# Patient Record
Sex: FEMALE | Race: WHITE | NOT HISPANIC OR LATINO | Employment: UNEMPLOYED | URBAN - METROPOLITAN AREA
[De-identification: names, ages, dates, MRNs, and addresses within clinical notes are randomized per-mention and may not be internally consistent; named-entity substitution may affect disease eponyms.]

---

## 2017-06-25 ENCOUNTER — HOSPITAL ENCOUNTER (EMERGENCY)
Facility: HOSPITAL | Age: 52
Discharge: HOME | End: 2017-06-25
Attending: EMERGENCY MEDICINE
Payer: COMMERCIAL

## 2017-06-25 VITALS
SYSTOLIC BLOOD PRESSURE: 146 MMHG | HEIGHT: 60 IN | HEART RATE: 82 BPM | TEMPERATURE: 99 F | DIASTOLIC BLOOD PRESSURE: 73 MMHG | WEIGHT: 170 LBS | BODY MASS INDEX: 33.38 KG/M2 | OXYGEN SATURATION: 98 % | RESPIRATION RATE: 16 BRPM

## 2017-06-25 DIAGNOSIS — R51.9 HEADACHE: ICD-10-CM

## 2017-06-25 DIAGNOSIS — S00.03XA CONTUSION OF SCALP, INITIAL ENCOUNTER: Primary | ICD-10-CM

## 2017-06-25 PROCEDURE — 25000003 PHARM REV CODE 250: Performed by: EMERGENCY MEDICINE

## 2017-06-25 PROCEDURE — 99284 EMERGENCY DEPT VISIT MOD MDM: CPT

## 2017-06-25 RX ORDER — HYDROCODONE BITARTRATE AND ACETAMINOPHEN 5; 325 MG/1; MG/1
1 TABLET ORAL
Status: COMPLETED | OUTPATIENT
Start: 2017-06-25 | End: 2017-06-25

## 2017-06-25 RX ADMIN — HYDROCODONE BITARTRATE AND ACETAMINOPHEN 1 TABLET: 5; 325 TABLET ORAL at 08:06

## 2017-06-26 NOTE — ED PROVIDER NOTES
"SCRIBE #1 NOTE: I, Kerrie Lara, am scribing for, and in the presence of, Myron Calle MD. I have scribed the entire note.      History      Chief Complaint   Patient presents with    Headache     was hit in head 2 days ago has hurt since then        Review of patient's allergies indicates:  No Known Allergies     HPI   HPI    2017, 8:00 PM   History obtained from the patient      History of Present Illness: Dorothea George is a 52 y.o. female patient who presents to the Emergency Department for HA which onset gradually 2 days ago. Pt reports being hit in on the L parietal region of her head by a ceramic toy train at [facility] 2 days ago and has been having HA since. Symptoms are constant and moderate in severity. The patient describes the sxs as "shooting". No mitigating or exacerbating factors reported. Associated sxs include nausea and dizziness. Prior treatment includes Advil with minimal relief. Pt states her BP is normally ranges from 124-138. Patient denies any fever, chills, neck pain/stiffness, photophobia, visual disturbance, extremity weakness/numbness, vomiting, and all other sxs at this time. No further complaints or concerns at this time.     Arrival mode: Personal vehicle      PCP: Primary Doctor No       Past Medical History:  Past Medical History:   Diagnosis Date    Osteomalacia pelvis        Past Surgical History:  Past Surgical History:   Procedure Laterality Date     SECTION, CLASSIC      HIP SURGERY      TONSILLECTOMY           Family History:  No family history on file.    Social History:  Social History     Social History Main Topics    Smoking status: Never Smoker    Smokeless tobacco: Unknown    Alcohol use No    Drug use: No    Sexual activity: Unknown       ROS   Review of Systems   Constitutional: Negative for chills and fever.   HENT: Negative for sore throat.    Eyes: Negative for photophobia and visual disturbance.   Respiratory: Negative for shortness of " breath.    Cardiovascular: Negative for chest pain.   Gastrointestinal: Positive for nausea. Negative for vomiting.   Genitourinary: Negative for dysuria.   Musculoskeletal: Negative for back pain, neck pain and neck stiffness.   Skin: Negative for rash.   Neurological: Positive for dizziness and headaches. Negative for weakness and numbness.   Hematological: Does not bruise/bleed easily.   All other systems reviewed and are negative.      Physical Exam      Initial Vitals [06/25/17 1943]   BP Pulse Resp Temp SpO2   (!) 207/112 90 16 98.6 °F (37 °C) 99 %      MAP       143.67          Physical Exam  Nursing Notes and Vital Signs Reviewed.  Constitutional: Patient is in no acute distress. Well-developed and well-nourished.  Head: Contusion to L parietal region. No crepitance. Normocephalic.  Eyes: PERRL. EOM intact. Conjunctivae are not pale. No scleral icterus.  ENT: Mucous membranes are moist. Oropharynx is clear and symmetric.    Neck: Supple. Full ROM. No lymphadenopathy.  Cardiovascular: Regular rate. Regular rhythm. No murmurs, rubs, or gallops. Distal pulses are 2+ and symmetric.  Pulmonary/Chest: No respiratory distress. Clear to auscultation bilaterally. No wheezing, rales, or rhonchi.  Abdominal: Soft and non-distended.  There is no tenderness.  No rebound, guarding, or rigidity. Good bowel sounds.  Genitourinary: No CVA tenderness  Musculoskeletal: Moves all extremities. No obvious deformities. No edema. No calf tenderness.  Skin: Warm and dry.  Neurological:  Alert, awake, and appropriate.  Normal speech.  No acute focal neurological deficits are appreciated.  Psychiatric: Normal affect. Good eye contact. Appropriate in content.    ED Course    Procedures  ED Vital Signs:  Vitals:    06/25/17 1943 06/25/17 2011 06/25/17 2017 06/25/17 2032   BP: (!) 207/112 (!) 180/90 (!) 158/75 (!) 146/73   Pulse: 90  84 82   Resp: 16      Temp: 98.6 °F (37 °C)      TempSrc: Oral      SpO2: 99%  99% 98%   Weight: 77.1 kg  (170 lb)      Height: 5' (1.524 m)        Imaging Results:  Imaging Results          CT Head Without Contrast (Final result)  Result time 06/25/17 20:31:50    Final result by Willis Cano MD (06/25/17 20:31:50)                 Impression:         Negative noncontrast CT scan of the brain.         All CT scans at this facility use dose modulation, iterative reconstruction, and/or weight based dosing when appropriate to reduce radiation dose to as low as reasonably achievable.       Electronically signed by: WILLIS CANO MD  Date:     06/25/17  Time:    20:31              Narrative:    CT HEAD WITHOUT CONTRAST    Date: 06/25/17 20:11:52    Clinical indication: Head trauma, headache, hit on left parietal region 2 days ago by a ceramic toy train at [employer].    Technique:  Standard noncontrast CT scan of the brain. No previous for comparison.     Findings:  The ventricles are nondilated. Gray and white matter structures reveal normal attenuation. No hemorrhage, mass effect or edema is identified.     The skull and skull base are unremarkable. Visualized paranasal sinuses and mastoid air cells are clear.                                      The Emergency Provider reviewed the vital signs and test results, which are outlined above.    ED Discussion     8:38 PM: Reassessed pt at this time.  Pt states her condition has improved at this time. Pt's BP has come down. Discussed with pt all pertinent ED information and results. Discussed pt dx of contusion of scalp and plan of tx. Gave pt all f/u and return to the ED instructions. All questions and concerns were addressed at this time. Pt expresses understanding of information and instructions, and is comfortable with plan to discharge. Pt is stable for discharge.  Discussed with pt to f/u with PCP.    Patient's headache is either consistent with previous headache and/or lacks features concerning for emergent or life threatening condition.  I do not suspect SAH,  meningitis, increased IC pressure, infectious, toxic, vascular, CNS, or other EMC.  I have discussed this at length with patient and/or family/caretaker.    ED Medication(s):  Medications   hydrocodone-acetaminophen 5-325mg per tablet 1 tablet (1 tablet Oral Given 6/25/17 2016)       New Prescriptions    No medications on file       Follow-up Information     [facility] - .    Specialty:  Emergency Medicine  Why:  If symptoms worsen.  Patient should return to the ED for any concerns or worsening of condition.  Contact information:  [address] [facility] [address] [phone]                   Medical Decision Making    Medical Decision Making:   Clinical Tests:   Radiological Study: Ordered and Reviewed           Scribe Attestation:   Scribe #1: I performed the above scribed service and the documentation accurately describes the services I performed. I attest to the accuracy of the note.    Attending:   Physician Attestation Statement for Scribe #1: I, Myron Calle MD, personally performed the services described in this documentation, as scribed by Kerrie Lara, in my presence, and it is both accurate and complete.          Clinical Impression       ICD-10-CM ICD-9-CM   1. Contusion of scalp, initial encounter S00.03XA 920   2. Headache R51 784.0       Disposition:   Disposition: Discharged  Condition: Stable         Myron Calle MD  06/25/17 2400

## 2017-06-27 ENCOUNTER — OFFICE VISIT (OUTPATIENT)
Dept: INTERNAL MEDICINE | Facility: CLINIC | Age: 52
End: 2017-06-27
Payer: COMMERCIAL

## 2017-06-27 VITALS
OXYGEN SATURATION: 98 % | WEIGHT: 174.63 LBS | HEIGHT: 60 IN | SYSTOLIC BLOOD PRESSURE: 154 MMHG | DIASTOLIC BLOOD PRESSURE: 96 MMHG | HEART RATE: 95 BPM | TEMPERATURE: 100 F | BODY MASS INDEX: 34.28 KG/M2

## 2017-06-27 DIAGNOSIS — S00.03XA CONTUSION OF SCALP, INITIAL ENCOUNTER: ICD-10-CM

## 2017-06-27 DIAGNOSIS — Z00.00 ROUTINE CHECK-UP: Primary | ICD-10-CM

## 2017-06-27 PROCEDURE — 99999 PR PBB SHADOW E&M-EST. PATIENT-LVL III: CPT | Mod: PBBFAC,,, | Performed by: NURSE PRACTITIONER

## 2017-06-27 PROCEDURE — 99204 OFFICE O/P NEW MOD 45 MIN: CPT | Mod: S$GLB,,, | Performed by: NURSE PRACTITIONER

## 2017-06-27 RX ORDER — PROMETHAZINE HYDROCHLORIDE 25 MG/1
25 TABLET ORAL EVERY 6 HOURS PRN
Qty: 20 TABLET | Refills: 0 | Status: SHIPPED | OUTPATIENT
Start: 2017-06-27 | End: 2017-10-13 | Stop reason: ALTCHOICE

## 2017-06-27 RX ORDER — PROMETHAZINE HYDROCHLORIDE 25 MG/1
25 TABLET ORAL EVERY 6 HOURS PRN
Qty: 20 TABLET | Refills: 0 | Status: SHIPPED | OUTPATIENT
Start: 2017-06-27 | End: 2017-06-27

## 2017-06-27 RX ORDER — HYDROCODONE BITARTRATE AND ACETAMINOPHEN 5; 325 MG/1; MG/1
1 TABLET ORAL EVERY 8 HOURS PRN
Qty: 20 TABLET | Refills: 0 | Status: SHIPPED | OUTPATIENT
Start: 2017-06-27 | End: 2017-10-13 | Stop reason: ALTCHOICE

## 2017-06-27 NOTE — PROGRESS NOTES
Subjective:      Patient ID: Dorothea George is a 52 y.o. female.    Chief Complaint: Head contusion (rack fell on head at work)    HPI:  Patient is here for a follow up from and ER visit two days ago and to establish care with dr razo as her pcp.  She was seen in the ER after hitting her head on a kid's ride at [employer].  She hit the left side of her head, had lots of headaches and pain the next day also.  She had a CT scan in the ER and was given one Norco.  She has headaches on occasion still, when she does her BP goes up.  At home it was 122/80.  Says she is nauseated sometimes, no vomiting.  When she reads her headache returns    Past Medical History:   Diagnosis Date    Osteomalacia pelvis        Past Surgical History:   Procedure Laterality Date     SECTION, CLASSIC      HIP SURGERY      TONSILLECTOMY         Lab Results   Component Value Date    WBC 12.74 (H) 2016    HGB 13.3 2016    HCT 37.9 2016     2016    ALT 50 (H) 2016    AST 45 (H) 2016     2016    K 3.8 2016     2016    CREATININE 0.9 2016    BUN 11 2016    CO2 21 (L) 2016    INR 1.0 2016       BP (!) 154/96   Pulse 95   Temp 100.1 °F (37.8 °C) (Tympanic)   Ht 5' (1.524 m)   Wt 79.2 kg (174 lb 9.7 oz)   SpO2 98%   BMI 34.10 kg/m²       Review of Systems   Constitutional: Negative for appetite change, fatigue and unexpected weight change.   HENT: Negative for congestion, ear pain, postnasal drip, rhinorrhea, sinus pressure, sneezing, sore throat, tinnitus, trouble swallowing and voice change.    Eyes: Negative for pain, discharge, redness, itching and visual disturbance.   Respiratory: Negative for cough, chest tightness, shortness of breath and wheezing.    Cardiovascular: Negative for chest pain, palpitations and leg swelling.   Gastrointestinal: Negative for abdominal distention, abdominal pain, blood in stool, constipation,  diarrhea, nausea and vomiting.        No reflux.   Genitourinary: Negative for difficulty urinating, dyspareunia, flank pain, menstrual problem and pelvic pain.   Musculoskeletal: Negative for arthralgias, back pain, myalgias and neck stiffness.   Skin: Negative for color change and rash.   Neurological: Positive for headaches. Negative for dizziness.   Psychiatric/Behavioral: Negative for confusion and sleep disturbance. The patient is not nervous/anxious.       Objective:     Physical Exam   Constitutional: She is oriented to person, place, and time. She appears well-developed and well-nourished. No distress.   Eyes: Conjunctivae are normal. Pupils are equal, round, and reactive to light.   Neck: Normal range of motion.   Musculoskeletal:   Normal gait   Neurological: She is alert and oriented to person, place, and time. She displays normal reflexes. No cranial nerve deficit. Coordination normal.   Skin: Skin is warm and dry.   Psychiatric: She has a normal mood and affect. Her behavior is normal.     Assessment:      1. Routine check-up    2. Contusion of scalp, initial encounter      Plan:   Routine check-up  -     Discontinue: promethazine (PHENERGAN) 25 MG tablet; Take 1 tablet (25 mg total) by mouth every 6 (six) hours as needed for Nausea.  Dispense: 20 tablet; Refill: 0  -     TSH; Future; Expected date: 06/27/2017  -     CBC auto differential; Future; Expected date: 06/27/2017  -     Lipid panel; Future; Expected date: 06/27/2017  -     Comprehensive metabolic panel; Future; Expected date: 06/27/2017    Contusion of scalp, initial encounter    Other orders  -     promethazine (PHENERGAN) 25 MG tablet; Take 1 tablet (25 mg total) by mouth every 6 (six) hours as needed for Nausea.  Dispense: 20 tablet; Refill: 0  -     hydrocodone-acetaminophen 5-325mg (NORCO) 5-325 mg per tablet; Take 1 tablet by mouth every 8 (eight) hours as needed for Pain.  Dispense: 20 tablet; Refill: 0    we discussed reasons to  return to the ER.  Needs brain rest. Discussed no reading, watching TV, computers, smart phones for the next several days.  Advance as tolerated.  Will see dr razo to [facility] care      Current Outpatient Prescriptions:     hydrocodone-acetaminophen 5-325mg (NORCO) 5-325 mg per tablet, Take 1 tablet by mouth every 8 (eight) hours as needed for Pain., Disp: 20 tablet, Rfl: 0    promethazine (PHENERGAN) 25 MG tablet, Take 1 tablet (25 mg total) by mouth every 6 (six) hours as needed for Nausea., Disp: 20 tablet, Rfl: 0

## 2017-06-29 ENCOUNTER — LAB VISIT (OUTPATIENT)
Dept: LAB | Facility: HOSPITAL | Age: 52
End: 2017-06-29
Attending: NURSE PRACTITIONER
Payer: COMMERCIAL

## 2017-06-29 DIAGNOSIS — Z00.00 ROUTINE CHECK-UP: ICD-10-CM

## 2017-06-29 LAB
ALBUMIN SERPL BCP-MCNC: 3.9 G/DL
ALP SERPL-CCNC: 70 U/L
ALT SERPL W/O P-5'-P-CCNC: 44 U/L
ANION GAP SERPL CALC-SCNC: 10 MMOL/L
AST SERPL-CCNC: 48 U/L
BASOPHILS # BLD AUTO: 0.02 K/UL
BASOPHILS NFR BLD: 0.3 %
BILIRUB SERPL-MCNC: 0.5 MG/DL
BUN SERPL-MCNC: 13 MG/DL
CALCIUM SERPL-MCNC: 9.6 MG/DL
CHLORIDE SERPL-SCNC: 104 MMOL/L
CHOLEST/HDLC SERPL: 6.8 {RATIO}
CO2 SERPL-SCNC: 26 MMOL/L
CREAT SERPL-MCNC: 0.8 MG/DL
DIFFERENTIAL METHOD: NORMAL
EOSINOPHIL # BLD AUTO: 0.2 K/UL
EOSINOPHIL NFR BLD: 2.7 %
ERYTHROCYTE [DISTWIDTH] IN BLOOD BY AUTOMATED COUNT: 13 %
EST. GFR  (AFRICAN AMERICAN): >60 ML/MIN/1.73 M^2
EST. GFR  (NON AFRICAN AMERICAN): >60 ML/MIN/1.73 M^2
GLUCOSE SERPL-MCNC: 96 MG/DL
HCT VFR BLD AUTO: 40 %
HDL/CHOLESTEROL RATIO: 14.8 %
HDLC SERPL-MCNC: 284 MG/DL
HDLC SERPL-MCNC: 42 MG/DL
HGB BLD-MCNC: 13.6 G/DL
LDLC SERPL CALC-MCNC: 187 MG/DL
LYMPHOCYTES # BLD AUTO: 2.4 K/UL
LYMPHOCYTES NFR BLD: 36 %
MCH RBC QN AUTO: 30.6 PG
MCHC RBC AUTO-ENTMCNC: 34 %
MCV RBC AUTO: 90 FL
MONOCYTES # BLD AUTO: 0.5 K/UL
MONOCYTES NFR BLD: 7.5 %
NEUTROPHILS # BLD AUTO: 3.6 K/UL
NEUTROPHILS NFR BLD: 53.4 %
NONHDLC SERPL-MCNC: 242 MG/DL
PLATELET # BLD AUTO: 294 K/UL
PMV BLD AUTO: 9.8 FL
POTASSIUM SERPL-SCNC: 4.4 MMOL/L
PROT SERPL-MCNC: 7.8 G/DL
RBC # BLD AUTO: 4.44 M/UL
SODIUM SERPL-SCNC: 140 MMOL/L
TRIGL SERPL-MCNC: 275 MG/DL
WBC # BLD AUTO: 6.78 K/UL

## 2017-06-29 PROCEDURE — 84443 ASSAY THYROID STIM HORMONE: CPT

## 2017-06-29 PROCEDURE — 80053 COMPREHEN METABOLIC PANEL: CPT

## 2017-06-29 PROCEDURE — 80061 LIPID PANEL: CPT

## 2017-06-29 PROCEDURE — 85025 COMPLETE CBC W/AUTO DIFF WBC: CPT

## 2017-06-29 PROCEDURE — 36415 COLL VENOUS BLD VENIPUNCTURE: CPT | Mod: PO

## 2017-06-30 LAB — TSH SERPL DL<=0.005 MIU/L-ACNC: 3.51 UIU/ML

## 2017-07-06 ENCOUNTER — OFFICE VISIT (OUTPATIENT)
Dept: INTERNAL MEDICINE | Facility: CLINIC | Age: 52
End: 2017-07-06
Payer: COMMERCIAL

## 2017-07-06 VITALS
BODY MASS INDEX: 33.17 KG/M2 | WEIGHT: 175.69 LBS | OXYGEN SATURATION: 97 % | HEIGHT: 61 IN | DIASTOLIC BLOOD PRESSURE: 82 MMHG | HEART RATE: 104 BPM | SYSTOLIC BLOOD PRESSURE: 126 MMHG | TEMPERATURE: 100 F

## 2017-07-06 DIAGNOSIS — E78.00 PURE HYPERCHOLESTEROLEMIA: ICD-10-CM

## 2017-07-06 DIAGNOSIS — Z00.00 ROUTINE GENERAL MEDICAL EXAMINATION AT A HEALTH CARE FACILITY: Primary | ICD-10-CM

## 2017-07-06 PROCEDURE — 99396 PREV VISIT EST AGE 40-64: CPT | Mod: S$GLB,,, | Performed by: INTERNAL MEDICINE

## 2017-07-06 PROCEDURE — 99999 PR PBB SHADOW E&M-EST. PATIENT-LVL III: CPT | Mod: PBBFAC,,, | Performed by: INTERNAL MEDICINE

## 2017-07-06 RX ORDER — ATORVASTATIN CALCIUM 20 MG/1
20 TABLET, FILM COATED ORAL DAILY
Qty: 90 TABLET | Refills: 3 | Status: SHIPPED | OUTPATIENT
Start: 2017-07-06 | End: 2021-02-03

## 2017-07-06 NOTE — PROGRESS NOTES
"HPI:  Patient is a 52-year-old female who comes in today for her initial visit myself to establish care.  Patient at this time has no acute medical problems or complaints.    Current MEDS: medcard review, verified and update  Allergies: Per the electronic medical record    Past Medical History:   Diagnosis Date    Hyperlipidemia     Osteomalacia pelvis        Past Surgical History:   Procedure Laterality Date     SECTION, CLASSIC      TONSILLECTOMY      TOTAL HIP ARTHROPLASTY Left     revised once as well       SHx: per the electronic medical record    FHx: recorded in the electronic medical record    ROS:    denies any chest pains or shortness of breath. Denies any nausea, vomiting or diarrhea. Denies any fever, chills or sweats. Denies any change in weight, voice, stool, skin or hair. Denies any dysuria, dyspepsia or dysphagia. Denies any change in vision, hearing or headaches. Denies any swollen lymph nodes or loss of memory.    PE:  /82 (BP Location: Right arm, Patient Position: Sitting, BP Method: Manual)   Pulse 104   Temp 99.9 °F (37.7 °C) (Tympanic)   Ht 5' 1" (1.549 m)   Wt 79.7 kg (175 lb 11.3 oz)   SpO2 97%   BMI 33.20 kg/m²   Gen: Well-developed, well-nourished, female, in no acute distress, oriented x3  HEENT: neck is supple, no adenopathy, carotids 2+ equal without bruits, thyroid exam normal size without nodules.  CHEST: clear to auscultation and percussion  CVS: regular rate and rhythm without significant murmur, gallop, or rubs  ABD: soft, benign, no rebound no guarding, no distention. Bowel sounds are normal.     Nontender,  no palpable masses, no organomegaly and no audible bruits.  BREAST: no masses.  No nipple inversion, retraction, or deviation.  EXT: no clubbing, cyanosis, or edema  LYMPH: no cervical, inguinal, or axillary adenopathy  FEET: no loss of sensation.  No ulcers or pressure sores.  NEURO: gait normal.  Cranial nerves II- XII intact. No nystagmus.  Speech " normal.   Gross motor and sensory unremarkable.  PELVIC: deferred    Lab Results   Component Value Date    WBC 6.78 06/29/2017    HGB 13.6 06/29/2017    HCT 40.0 06/29/2017     06/29/2017    CHOL 284 (H) 06/29/2017    TRIG 275 (H) 06/29/2017    HDL 42 06/29/2017    ALT 44 06/29/2017    AST 48 (H) 06/29/2017     06/29/2017    K 4.4 06/29/2017     06/29/2017    CREATININE 0.8 06/29/2017    BUN 13 06/29/2017    CO2 26 06/29/2017    TSH 3.514 06/29/2017    INR 1.0 05/02/2016       Impression:  Patient Active Problem List   Diagnosis    Hyperlipidemia       Plan:   Orders Placed This Encounter    Lipid panel    atorvastatin (LIPITOR) 20 MG tablet     Patient was started on Lipitor 20 mg daily.  She'll have repeat lipids to be seen by the nurse practitioner in 3 months.  Patient refuses colonoscopy screening, mammograms, and Pap smears

## 2017-07-06 NOTE — LETTER
July 6, 2017      Adrien Harrison, JEANNIE  [address] [facility] [id number] [place] [address] Internal [facility]  [address] [facility] [address]  Phone: [phone]          Patient: Dorothea George   MR Number: [id number]   YOB: 1965   Date of Visit: 7/6/2017       Dear Adrien Harrison:    Thank you for referring Dorothea George to me for evaluation. Attached you will find relevant portions of my assessment and plan of care.    If you have questions, please do not hesitate to call me. I look forward to following Dorothea George along with you.    Sincerely,    Dannie Berrios MD    Enclosure  CC:  No Recipients    If you would like to receive this communication electronically, please contact [email or web address] or [phone] to request more information on [employer] Link access.    For providers and/or their staff who would like to refer a patient to [facility], please contact us through our one-stop-shop provider referral line, Syeda Roy, at [phone].    If you feel you have received this communication in error or would no longer like to receive these types of communications, please e-mail [email or web address]

## 2017-10-05 ENCOUNTER — TELEPHONE (OUTPATIENT)
Dept: INTERNAL MEDICINE | Facility: CLINIC | Age: 52
End: 2017-10-05

## 2017-10-05 NOTE — TELEPHONE ENCOUNTER
Called pt.  Rescheduled appointment to 10/6/17.  Pt reports coughing,sinus and congestion.  Pt voiced understanding.

## 2017-10-05 NOTE — TELEPHONE ENCOUNTER
----- Message from Juanis Espinoza sent at 10/5/2017 12:01 PM CDT -----  Contact: Patient  Patient is returning a call, please call them back at [phone]. Thank you

## 2017-10-05 NOTE — TELEPHONE ENCOUNTER
----- Message from Kenzie Tate sent at 10/5/2017  9:26 AM CDT -----  Pt is requesting a call from nurse to discuss a prescription for her sinus infection.        Please call pt back at [phone]              [facility] [id number] [place], [place] - [address] AT [facility] [address] [facility]  [address]  [facility] [address]  Phone: [phone] Fax: [phone]

## 2017-10-06 ENCOUNTER — OFFICE VISIT (OUTPATIENT)
Dept: INTERNAL MEDICINE | Facility: CLINIC | Age: 52
End: 2017-10-06
Payer: COMMERCIAL

## 2017-10-06 VITALS
HEART RATE: 83 BPM | OXYGEN SATURATION: 98 % | BODY MASS INDEX: 32.37 KG/M2 | TEMPERATURE: 98 F | HEIGHT: 60 IN | WEIGHT: 164.88 LBS | SYSTOLIC BLOOD PRESSURE: 136 MMHG | DIASTOLIC BLOOD PRESSURE: 86 MMHG

## 2017-10-06 DIAGNOSIS — J06.9 VIRAL URI WITH COUGH: Primary | ICD-10-CM

## 2017-10-06 PROCEDURE — 99213 OFFICE O/P EST LOW 20 MIN: CPT | Mod: 25,S$GLB,, | Performed by: NURSE PRACTITIONER

## 2017-10-06 PROCEDURE — 99999 PR PBB SHADOW E&M-EST. PATIENT-LVL III: CPT | Mod: PBBFAC,,, | Performed by: NURSE PRACTITIONER

## 2017-10-06 PROCEDURE — 96372 THER/PROPH/DIAG INJ SC/IM: CPT | Mod: S$GLB,,, | Performed by: NURSE PRACTITIONER

## 2017-10-06 RX ORDER — LEVOCETIRIZINE DIHYDROCHLORIDE 5 MG/1
5 TABLET, FILM COATED ORAL NIGHTLY
Qty: 30 TABLET | Refills: 0 | Status: SHIPPED | OUTPATIENT
Start: 2017-10-06 | End: 2017-10-16

## 2017-10-06 RX ORDER — FLUTICASONE PROPIONATE 50 MCG
1 SPRAY, SUSPENSION (ML) NASAL DAILY
Qty: 1 BOTTLE | Refills: 3 | Status: SHIPPED | OUTPATIENT
Start: 2017-10-06 | End: 2021-02-03

## 2017-10-06 RX ORDER — METHYLPREDNISOLONE ACETATE 80 MG/ML
80 INJECTION, SUSPENSION INTRA-ARTICULAR; INTRALESIONAL; INTRAMUSCULAR; SOFT TISSUE
Status: COMPLETED | OUTPATIENT
Start: 2017-10-06 | End: 2017-10-06

## 2017-10-06 RX ADMIN — METHYLPREDNISOLONE ACETATE 80 MG: 80 INJECTION, SUSPENSION INTRA-ARTICULAR; INTRALESIONAL; INTRAMUSCULAR; SOFT TISSUE at 08:10

## 2017-10-06 NOTE — PROGRESS NOTES
Depo-Medrol 80 mg/ml IM right ventrogluteal.  Lot# [id number] exp 01/2020 [email or web address].  Pt advised to wait in clinic 15 minutes to monitor for side effects.  Pt voiced understanding and tolerated injection well.

## 2017-10-06 NOTE — PROGRESS NOTES
Subjective:      Patient ID: Dorothea George is a 52 y.o. female.    Chief Complaint: Follow-up (cough/sinus)    HPI:  Patient states for the last week she has woken up with a sore throat, coughing, congested.  Had a low grade fever for a couple of days, that has improved, but she continues to feel congested, chest feels full, stuffy.  She took Advil, Tylenol, Benadryl at home.  Says others are sick at home.      Past Medical History:   Diagnosis Date    Hyperlipidemia     Osteomalacia pelvis        Past Surgical History:   Procedure Laterality Date     SECTION, CLASSIC      TONSILLECTOMY      TOTAL HIP ARTHROPLASTY Left     revised once as well       Lab Results   Component Value Date    WBC 6.78 2017    HGB 13.6 2017    HCT 40.0 2017     2017    CHOL 284 (H) 2017    TRIG 275 (H) 2017    HDL 42 2017    ALT 44 2017    AST 48 (H) 2017     2017    K 4.4 2017     2017    CREATININE 0.8 2017    BUN 13 2017    CO2 26 2017    TSH 3.514 2017    INR 1.0 2016       /86 (BP Location: Left arm, Patient Position: Sitting, BP Method: Medium (Manual))   Pulse 83   Temp 98.2 °F (36.8 °C) (Tympanic)   Ht 5' (1.524 m)   Wt 74.8 kg (164 lb 14.5 oz)   SpO2 98%   BMI 32.21 kg/m²       Review of Systems   Constitutional: Positive for fatigue. Negative for appetite change and unexpected weight change.   HENT: Positive for congestion, ear pain and sinus pressure. Negative for postnasal drip, rhinorrhea, sneezing, sore throat, tinnitus, trouble swallowing and voice change.    Eyes: Negative for pain, discharge, redness, itching and visual disturbance.   Respiratory: Positive for cough. Negative for chest tightness, shortness of breath and wheezing.    Cardiovascular: Negative for chest pain, palpitations and leg swelling.   Gastrointestinal: Negative for abdominal distention, abdominal pain,  blood in stool, constipation, diarrhea, nausea and vomiting.        No reflux.   Genitourinary: Negative for difficulty urinating, dyspareunia, flank pain, menstrual problem and pelvic pain.   Musculoskeletal: Negative for arthralgias, back pain, myalgias and neck stiffness.   Skin: Negative for color change and rash.   Neurological: Negative for dizziness and headaches.   Psychiatric/Behavioral: Negative for confusion and sleep disturbance. The patient is not nervous/anxious.       Objective:     Physical Exam   Constitutional: She is oriented to person, place, and time. She appears well-developed and well-nourished. No distress.   HENT:   Head: Normocephalic and atraumatic.   Mouth/Throat: Oropharynx is clear and moist.   Nasal mucosa pink and moist  Bilateral TM with a little bulge, no redness    Cardiovascular: Normal rate, regular rhythm and normal heart sounds.  Exam reveals no gallop and no friction rub.    No murmur heard.  Pulmonary/Chest: Effort normal and breath sounds normal. No respiratory distress. She has no wheezes. She has no rales.   Musculoskeletal: She exhibits no edema or tenderness.   Lymphadenopathy:     She has no cervical adenopathy.   Neurological: She is alert and oriented to person, place, and time. No cranial nerve deficit.   Skin: Skin is warm and dry. She is not diaphoretic.   Psychiatric: She has a normal mood and affect. Her behavior is normal.   Nursing note and vitals reviewed.    Assessment:      No diagnosis found.  Plan:   There are no diagnoses linked to this encounter.      Current Outpatient Prescriptions:     atorvastatin (LIPITOR) 20 MG tablet, Take 1 tablet (20 mg total) by mouth once daily., Disp: 90 tablet, Rfl: 3    fluticasone (FLONASE) 50 mcg/actuation nasal spray, 1 spray by Each Nare route once daily., Disp: 1 Bottle, Rfl: 3    hydrocodone-acetaminophen 5-325mg (NORCO) 5-325 mg per tablet, Take 1 tablet by mouth every 8 (eight) hours as needed for Pain., Disp: 20  tablet, Rfl: 0    levocetirizine (XYZAL) 5 MG tablet, Take 1 tablet (5 mg total) by mouth every evening., Disp: 30 tablet, Rfl: 0    promethazine (PHENERGAN) 25 MG tablet, Take 1 tablet (25 mg total) by mouth every 6 (six) hours as needed for Nausea., Disp: 20 tablet, Rfl: 0    Current Facility-Administered Medications:     methylPREDNISolone acetate injection 80 mg, 80 mg, Intramuscular, 1 time in Clinic/HOD, Adrien Harrison, NP

## 2017-10-09 ENCOUNTER — APPOINTMENT (OUTPATIENT)
Dept: LAB | Facility: HOSPITAL | Age: 52
End: 2017-10-09
Attending: INTERNAL MEDICINE
Payer: COMMERCIAL

## 2017-10-09 PROCEDURE — 36415 COLL VENOUS BLD VENIPUNCTURE: CPT | Mod: PO

## 2017-10-09 PROCEDURE — 80061 LIPID PANEL: CPT

## 2017-10-10 ENCOUNTER — TELEPHONE (OUTPATIENT)
Dept: INTERNAL MEDICINE | Facility: CLINIC | Age: 52
End: 2017-10-10

## 2017-10-10 NOTE — TELEPHONE ENCOUNTER
Called pt.  Informed her of results.  Pt reports that she did not take the Lipitor.  She changed her diet, exercised, and lost weight.  She would like to stay off the Lipitor for 3 months more and then recheck lab.  Please advise.

## 2017-10-10 NOTE — TELEPHONE ENCOUNTER
----- Message from Dannie Berrios MD sent at 10/10/2017  7:15 AM CDT -----  Your cholesterol is much higher than it was even before I started you on lipitor which tells me you are not taking it. Why?????

## 2017-10-11 NOTE — TELEPHONE ENCOUNTER
I strongly encourage her to take the medication as obviously the diet and exercise is not controlling her cholesterol as it is markedly elevated. I suggest she make an apt to discuss.

## 2017-10-13 ENCOUNTER — HOSPITAL ENCOUNTER (EMERGENCY)
Facility: HOSPITAL | Age: 52
Discharge: HOME | End: 2017-10-13
Attending: EMERGENCY MEDICINE
Payer: COMMERCIAL

## 2017-10-13 VITALS
WEIGHT: 162 LBS | BODY MASS INDEX: 31.8 KG/M2 | OXYGEN SATURATION: 99 % | RESPIRATION RATE: 16 BRPM | TEMPERATURE: 98 F | HEIGHT: 60 IN | DIASTOLIC BLOOD PRESSURE: 93 MMHG | SYSTOLIC BLOOD PRESSURE: 169 MMHG | HEART RATE: 73 BPM

## 2017-10-13 DIAGNOSIS — J20.9 ACUTE BRONCHITIS, UNSPECIFIED ORGANISM: Primary | ICD-10-CM

## 2017-10-13 DIAGNOSIS — R03.0 ELEVATED BLOOD PRESSURE READING WITHOUT DIAGNOSIS OF HYPERTENSION: ICD-10-CM

## 2017-10-13 PROCEDURE — 99283 EMERGENCY DEPT VISIT LOW MDM: CPT

## 2017-10-13 RX ORDER — ALBUTEROL SULFATE 90 UG/1
2 AEROSOL, METERED RESPIRATORY (INHALATION) EVERY 4 HOURS PRN
Qty: 1 INHALER | Refills: 0 | Status: SHIPPED | OUTPATIENT
Start: 2017-10-13 | End: 2021-02-03

## 2017-10-13 RX ORDER — HYDROCODONE POLISTIREX AND CHLORPHENIRAMINE POLISTIREX 10; 8 MG/5ML; MG/5ML
2.5 SUSPENSION, EXTENDED RELEASE ORAL EVERY 12 HOURS PRN
Qty: 35 ML | Refills: 0 | Status: SHIPPED | OUTPATIENT
Start: 2017-10-13 | End: 2017-10-20

## 2017-10-13 NOTE — ED NOTES
Pt states has been having upper resp symptoms for 2 weeks - seen at [facility] one week ago with steroid shot and rx.  States still having symptoms and c/o left ear hurting as well.   Armband checked, patient verified. Verified by spelling and stated name on armband along with .   LOC: The patient is awake, alert and aware of environment with an appropriate affect, the patient is oriented x 3 and speaking appropriately.  APPEARANCE: Patient resting comfortably and in no acute distress, patient is clean and well groomed, patient's clothing is properly fastened.  SKIN: The skin is warm and dry, color consistent with ethnicity, patient has normal skin turgor and moist mucus membranes, no breakdown or bruising noted.   MUSCULOSKELETAL: Patient moving all extremities spontaneously.  RESPIRATORY: Airway is open and patent, respirations are spontaneous.  BBS = clear; nonproductive cough noted.   CARDIAC: Patient has a normal rate, no periphreal edema noted, capillary refill < 3 seconds.  ABDOMEN: Soft and non tender to palpation.

## 2017-10-13 NOTE — ED PROVIDER NOTES
SCRIBE #1 NOTE: I, Blue Jackson, am scribing for, and in the presence of, Andrés Hernandez MD. I have scribed the entire note.      History      Chief Complaint   Patient presents with    URI     cough, nasal congestion, sore throat       Review of patient's allergies indicates:  No Known Allergies     HPI   HPI    10/13/2017, 3:11 AM   History obtained from the patient      History of Present Illness: Dorothea George is a 52 y.o. female patient who presents to the Emergency Department for an evaluation of a cough which onset gradually x2 weeks ago. Pt reports she was evaluated for her sx x1 week ago and was given steroids with some relief. Since then pt reports her sx have not resolved which has prompted her to come here for further evaluation. Symptoms are constant and moderate in severity. Exacerbated by nothing and relieved by nothing. Associated sxs include nasal congestion and sore throat. Patient denies any fever, chills, ear pain, trouble swallowing, SOB, CP, abd pain, N/V, dysuria, hematuria, HA, weakness, and all other sxs at this time. No further complaints or concerns at this time.     Arrival mode: Personal vehicle    PCP: Dannie Berrios MD       Past Medical History:  Past Medical History:   Diagnosis Date    Hyperlipidemia     Osteomalacia pelvis        Past Surgical History:  Past Surgical History:   Procedure Laterality Date     SECTION, CLASSIC      JOINT REPLACEMENT Left     Hip replacement    TONSILLECTOMY      TOTAL HIP ARTHROPLASTY Left     revised once as well         Family History:  Family History   Problem Relation Age of Onset    Coronary artery disease Mother     Stroke Mother     Lung cancer Father     Stroke Father     Diabetes Paternal Grandmother        Social History:  Social History     Social History Main Topics    Smoking status: Never Smoker    Smokeless tobacco: Never Used    Alcohol use No    Drug use: No    Sexual activity: Yes     Partners:  Male       ROS   Review of Systems   Constitutional: Negative for chills and fever.   HENT: Positive for congestion and sore throat. Negative for ear pain, rhinorrhea, sinus pressure and trouble swallowing.    Respiratory: Positive for cough. Negative for chest tightness and shortness of breath.    Cardiovascular: Negative for chest pain.   Gastrointestinal: Negative for abdominal pain, nausea and vomiting.   Genitourinary: Negative for dysuria and hematuria.   Musculoskeletal: Negative for neck pain and neck stiffness.   Skin: Negative for rash.   Neurological: Negative for dizziness, weakness, light-headedness and headaches.     Physical Exam      Initial Vitals [10/13/17 0255]   BP Pulse Resp Temp SpO2   (!) 169/93 73 16 97.8 °F (36.6 °C) 99 %      MAP       118.33          Physical Exam  Nursing Notes and Vital Signs Reviewed.  Constitutional: Patient is in no acute distress. Well-developed and well-nourished.  Head: Atraumatic. Normocephalic.  Eyes: PERRL. EOM intact. Conjunctivae are not pale. No scleral icterus.  ENT: Mucous membranes are moist. Oropharynx is clear and symmetric.    Neck: Supple. Full ROM. No lymphadenopathy.  Cardiovascular: Regular rate. Regular rhythm. No murmurs, rubs, or gallops. Distal pulses are 2+ and symmetric.  Pulmonary/Chest: No respiratory distress. Faint scattered expiratory wheezing.   Abdominal: Soft and non-distended.  There is no tenderness.  No rebound, guarding, or rigidity. Good bowel sounds.  Genitourinary: No CVA tenderness  Musculoskeletal: Moves all extremities. No obvious deformities. No edema. No calf tenderness.  Skin: Warm and dry.  Neurological:  Alert, awake, and appropriate.  Normal speech.  No acute focal neurological deficits are appreciated.  Psychiatric: Normal affect. Good eye contact. Appropriate in content.    ED Course    Procedures  ED Vital Signs:  Vitals:    10/13/17 0255   BP: (!) 169/93   Pulse: 73   Resp: 16   Temp: 97.8 °F (36.6 °C)   TempSrc:  Oral   SpO2: 99%   Weight: 73.5 kg (162 lb)   Height: 5' (1.524 m)            The Emergency Provider reviewed the vital signs and test results, which are outlined above.    ED Discussion     3:46 AM: All historical, clinical, radiographic, and laboratory findings were reviewed with the patient in detail.  Findings are consistent with a diagnosis of acute bronchitis.  All remaining questions and concerns were addressed at that time.  Patient has been counseled regarding the need for follow-up as well as the indication to return to the emergency room should new or worrisome developments occur.      Based on vital signs taken here in the emergency room today, the patient was counseled regarding an elevated blood pressure concerning for pre-hypertension/hypertension.  Accordingly the patient has been advised to follow with the primary care physician for reassessment and management as needed.        ED Medication(s):  Medications - No data to display    Discharge Medication List as of 10/13/2017  3:46 AM      START taking these medications    Details   albuterol 90 mcg/actuation inhaler Inhale 2 puffs into the lungs every 4 (four) hours as needed for Wheezing., Starting Fri 10/13/2017, Until Sat 10/13/2018, Print      hydrocodone-chlorpheniramine (TUSSIONEX) 10-8 mg/5 mL suspension Take 2.5 mLs by mouth every 12 (twelve) hours as needed for Cough., Starting Fri 10/13/2017, Until Fri 10/20/2017, Print             Follow-up Information     Dannie Berrios MD. Schedule an appointment as soon as possible for a visit on 10/16/2017.    Specialty:  Internal Medicine  Contact information:  [address]   [address]  [facility] [address]  [phone]                     Medical Decision Making              Scribe Attestation:   Scribe #1: I performed the above scribed service and the documentation accurately describes the services I performed. I attest to the accuracy of the note.    Attending:   Physician Attestation Statement  for Scribe #1: I, Andrés Hernandez MD, personally performed the services described in this documentation, as scribed by Blue Jackson, in my presence, and it is both accurate and complete.          Clinical Impression       ICD-10-CM ICD-9-CM   1. Acute bronchitis, unspecified organism J20.9 466.0   2. Elevated blood pressure reading without diagnosis of hypertension R03.0 796.2       Disposition:   Disposition: Discharged  Condition: Stable         Andrés Hernandez MD  10/13/17 1047

## 2017-10-16 ENCOUNTER — OFFICE VISIT (OUTPATIENT)
Dept: INTERNAL MEDICINE | Facility: CLINIC | Age: 52
End: 2017-10-16
Payer: COMMERCIAL

## 2017-10-16 ENCOUNTER — HOSPITAL ENCOUNTER (OUTPATIENT)
Dept: RADIOLOGY | Facility: HOSPITAL | Age: 52
Discharge: HOME | End: 2017-10-16
Attending: FAMILY MEDICINE
Payer: COMMERCIAL

## 2017-10-16 VITALS
TEMPERATURE: 101 F | BODY MASS INDEX: 32.2 KG/M2 | SYSTOLIC BLOOD PRESSURE: 162 MMHG | HEIGHT: 60 IN | WEIGHT: 164 LBS | DIASTOLIC BLOOD PRESSURE: 96 MMHG | HEART RATE: 110 BPM | OXYGEN SATURATION: 97 %

## 2017-10-16 DIAGNOSIS — J40 BRONCHITIS: ICD-10-CM

## 2017-10-16 DIAGNOSIS — R50.9 FEVER, UNSPECIFIED FEVER CAUSE: ICD-10-CM

## 2017-10-16 DIAGNOSIS — R50.9 FEVER, UNSPECIFIED FEVER CAUSE: Primary | ICD-10-CM

## 2017-10-16 PROCEDURE — 71020 XR CHEST PA AND LATERAL: CPT | Mod: 26,,, | Performed by: RADIOLOGY

## 2017-10-16 PROCEDURE — 99213 OFFICE O/P EST LOW 20 MIN: CPT | Mod: S$GLB,,, | Performed by: FAMILY MEDICINE

## 2017-10-16 PROCEDURE — 99999 PR PBB SHADOW E&M-EST. PATIENT-LVL III: CPT | Mod: PBBFAC,,, | Performed by: FAMILY MEDICINE

## 2017-10-16 PROCEDURE — 71020 XR CHEST PA AND LATERAL: CPT | Mod: TC,PO

## 2017-10-16 RX ORDER — DOXYCYCLINE 100 MG/1
100 CAPSULE ORAL 2 TIMES DAILY
Qty: 20 CAPSULE | Refills: 0 | Status: SHIPPED | OUTPATIENT
Start: 2017-10-16 | End: 2021-02-03

## 2017-10-16 RX ORDER — PREDNISONE 20 MG/1
40 TABLET ORAL DAILY
Qty: 8 TABLET | Refills: 0 | Status: SHIPPED | OUTPATIENT
Start: 2017-10-16 | End: 2017-10-20

## 2017-10-16 NOTE — PROGRESS NOTES
Subjective:       Patient ID: Dorothea George is a 52 y.o. female.    Chief Complaint: Cough; Fever; Night Sweats; and Headache      Patient complaining of worsened and continued coughing. Has been seen twice for this so far, most recently 3 days ago in ER but symptoms seem to be worsening. For past few nights has had subjective fevers, night sweats and achiness.  Coughing almost continually, has been using medications over the counter as well as using cough syrup and albuterol MDI.      Cough   Associated symptoms include chills, a fever, headaches, rhinorrhea, shortness of breath and wheezing. Pertinent negatives include no chest pain or eye redness.   Fever    Associated symptoms include congestion, coughing, headaches and wheezing. Pertinent negatives include no abdominal pain, chest pain, nausea or vomiting.   Headache    Associated symptoms include coughing, a fever, rhinorrhea and sinus pressure. Pertinent negatives include no abdominal pain, eye redness, nausea or vomiting.     Review of Systems   Constitutional: Positive for chills, fatigue and fever.   HENT: Positive for congestion, rhinorrhea, sinus pain and sinus pressure.    Eyes: Negative for redness.   Respiratory: Positive for cough, shortness of breath and wheezing.    Cardiovascular: Negative for chest pain and palpitations.   Gastrointestinal: Negative for abdominal pain, nausea and vomiting.   Neurological: Positive for headaches.     Past Medical History:   Diagnosis Date    Hyperlipidemia     Osteomalacia pelvis      Past Surgical History:   Procedure Laterality Date     SECTION, CLASSIC      JOINT REPLACEMENT Left     Hip replacement    TONSILLECTOMY      TOTAL HIP ARTHROPLASTY Left     revised once as well     Family History   Problem Relation Age of Onset    Coronary artery disease Mother     Stroke Mother     Lung cancer Father     Stroke Father     Diabetes Paternal Grandmother      Social History     Social History     Marital status:      Spouse name: N/A    Number of children: N/A    Years of education: N/A     Occupational History    Not on file.     Social History Main Topics    Smoking status: Never Smoker    Smokeless tobacco: Never Used    Alcohol use No    Drug use: No    Sexual activity: Yes     Partners: Male     Other Topics Concern    Not on file     Social History Narrative    No narrative on file     Review of patient's allergies indicates:  No Known Allergies    Objective:       BP (!) 162/96 (BP Location: Right arm, Patient Position: Sitting, BP Method: Medium (Manual))   Pulse 110   Temp (!) 101.3 °F (38.5 °C) (Tympanic)   Ht 5' (1.524 m)   Wt 74.4 kg (164 lb 0.4 oz)   SpO2 97%   BMI 32.03 kg/m²   Physical Exam   Constitutional: She appears well-developed and well-nourished. No distress.   HENT:   Head: Normocephalic and atraumatic.   Right Ear: Hearing, tympanic membrane, external ear and ear canal normal.   Left Ear: Hearing, tympanic membrane, external ear and ear canal normal.   Nose: Right sinus exhibits frontal sinus tenderness. Right sinus exhibits no maxillary sinus tenderness. Left sinus exhibits frontal sinus tenderness. Left sinus exhibits no maxillary sinus tenderness.   Mouth/Throat: Uvula is midline and mucous membranes are normal. Posterior oropharyngeal erythema present.   Eyes: Conjunctivae and EOM are normal. Pupils are equal, round, and reactive to light.   Neck: Normal range of motion. Neck supple. No tracheal deviation present. No thyromegaly present.   Cardiovascular: Normal rate, regular rhythm and normal heart sounds.    Pulmonary/Chest: Effort normal. No respiratory distress. She has wheezes.   Abdominal: Soft. Bowel sounds are normal.   Lymphadenopathy:     She has no cervical adenopathy.   Skin: She is not diaphoretic.   Psychiatric: She has a normal mood and affect. Her behavior is normal.   Nursing note and vitals reviewed.    Assessment:     1. Fever,  unspecified fever cause    2. Bronchitis      Plan:   Fever, unspecified fever cause  -     X-Ray Chest PA And Lateral; Future; Expected date: 10/16/2017    Bronchitis    Other orders  -     doxycycline (VIBRAMYCIN) 100 MG Cap; Take 1 capsule (100 mg total) by mouth 2 (two) times daily.  Dispense: 20 capsule; Refill: 0  -     predniSONE (DELTASONE) 20 MG tablet; Take 2 tablets (40 mg total) by mouth once daily.  Dispense: 8 tablet; Refill: 0      Medication List with Changes/Refills   New Medications    DOXYCYCLINE (VIBRAMYCIN) 100 MG CAP    Take 1 capsule (100 mg total) by mouth 2 (two) times daily.    PREDNISONE (DELTASONE) 20 MG TABLET    Take 2 tablets (40 mg total) by mouth once daily.   Current Medications    ALBUTEROL 90 MCG/ACTUATION INHALER    Inhale 2 puffs into the lungs every 4 (four) hours as needed for Wheezing.    ATORVASTATIN (LIPITOR) 20 MG TABLET    Take 1 tablet (20 mg total) by mouth once daily.    FLUTICASONE (FLONASE) 50 MCG/ACTUATION NASAL SPRAY    1 spray by Each Nare route once daily.    HYDROCODONE-CHLORPHENIRAMINE (TUSSIONEX) 10-8 MG/5 ML SUSPENSION    Take 2.5 mLs by mouth every 12 (twelve) hours as needed for Cough.   Discontinued Medications    LEVOCETIRIZINE (XYZAL) 5 MG TABLET    Take 1 tablet (5 mg total) by mouth every evening.

## 2017-10-16 NOTE — PATIENT INSTRUCTIONS
What Is Acute Bronchitis?  Acute bronchitis is when the airways in your lungs (bronchial tubes) become red and swollen (inflamed). It is usually caused by a viral infection. But it can also occur because of a bacteria or allergen. Symptoms include a cough that produces yellow or greenish mucus and can last for days or sometimes weeks.  Inside healthy lungs    Air travels in and out of the lungs through the airways. The linings of these airways produce sticky mucus. This mucus traps particles that enter the lungs. Tiny structures called cilia then sweep the particles out of the airways.     Healthy airway: Airways are normally open. Air moves in and out easily.      Healthy cilia: Tiny, hairlike cilia sweep mucus and particles up and out of the airways.   Lungs with bronchitis  Bronchitis often occurs with a cold or the flu virus. The airways become inflamed (red and swollen). There is a deep hacking cough from the extra mucus. Other symptoms may include:  · Wheezing  · Chest discomfort  · Shortness of breath  · Mild fever  A second infection, this time due to bacteria, may then occur. And airways irritated by allergens or smoke are more likely to get infected.        Inflamed airway: Inflammation and extra mucus narrow the airway, causing shortness of breath.      Impaired cilia: Extra mucus impairs cilia, causing congestion and wheezing. Smoking makes the problem worse.   Making a diagnosis  A physical exam, health history, and certain tests help your healthcare provider make the diagnosis.  Health history  Your healthcare provider will ask you about your symptoms.  The exam  Your provider listens to your chest for signs of congestion. He or she may also check your ears, nose, and throat.  Possible tests  · A sputum test for bacteria. This requires a sample of mucus from your lungs.  · A nasal or throat swab. This tests to see if you have a bacterial infection.  · A chest X-ray. This is done if your healthcare  provider thinks you have pneumonia.  · Tests to check for an underlying condition. Other tests may be done to check for things such as allergies, asthma, or COPD (chronic obstructive pulmonary disease). You may need to see a specialist for more lung function testing.  Treating a cough  The main treatment for bronchitis is easing symptoms. Avoiding smoke, allergens, and other things that trigger coughing can often help. If the infection is bacterial, you may be given antibiotics. During the illness, it's important to get plenty of sleep. To ease symptoms:  · Dont smoke. Also avoid secondhand smoke.  · Use a humidifier. Or try breathing in steam from a hot shower. This may help loosen mucus.  · Drink a lot of water and juice. They can soothe the throat and may help thin mucus.  · Sit up or use extra pillows when in bed. This helps to lessen coughing and congestion.  · Ask your provider about using medicine. Ask about using cough medicine, pain and fever medicine, or a decongestant.  Antibiotics  Most cases of bronchitis are caused by cold or flu viruses. They dont need antibiotics to treat them, even if your mucus is thick and green or yellow. Antibiotics dont treat viral illness and antibiotics have not been shown to have any benefit in cases of acute bronchitis. Taking antibiotics when they are not needed increases your risk of getting an infection later that is antibiotic-resistant. Antibiotics can also cause severe cases of diarrhea that require other antibiotics to treat.  It is important that you accept your healthcare provider's opinion to not use antibiotics. Your provider will prescribe antibiotics if the infection is caused by bacteria. If they are prescribed:  · Take all of the medicine. Take the medicine until it is used up, even if symptoms have improved. If you dont, the bronchitis may come back.  · Take the medicines as directed. For instance, some medicines should be taken with food.  · Ask about  side effects. Ask your provider or pharmacist what side effects are common, and what to do about them.  Follow-up care  You should see your provider again in 2 to 3 weeks. By this time, symptoms should have improved. An infection that lasts longer may mean you have a more serious problem.  Prevention  · Avoid tobacco smoke. If you smoke, quit. Stay away from smoky places. Ask friends and family not to smoke around you, or in your home or car.  · Get checked for allergies.  · Ask your provider about getting a yearly flu shot. Also ask about pneumococcal or pneumonia shots.  · Wash your hands often. This helps reduce the chance of picking up viruses that cause colds and flu.  Call your healthcare provider if:  · Symptoms worsen, or you have new symptoms  · Breathing problems worsen or  become severe  · Symptoms dont get better within a week, or within 3 days of taking antibiotics   Date Last Reviewed: 2/1/2017  © 6889-9075 [employer]. [address] [place] [address], [place] [address]. All rights reserved. This information is not intended as a substitute for professional medical care. Always follow your healthcare professional's instructions.

## 2018-02-16 ENCOUNTER — HOSPITAL ENCOUNTER (EMERGENCY)
Facility: HOSPITAL | Age: 53
Discharge: HOME | End: 2018-02-16
Attending: EMERGENCY MEDICINE
Payer: COMMERCIAL

## 2018-02-16 VITALS
TEMPERATURE: 98 F | BODY MASS INDEX: 32.81 KG/M2 | WEIGHT: 167.13 LBS | DIASTOLIC BLOOD PRESSURE: 67 MMHG | HEIGHT: 60 IN | OXYGEN SATURATION: 97 % | HEART RATE: 80 BPM | RESPIRATION RATE: 20 BRPM | SYSTOLIC BLOOD PRESSURE: 147 MMHG

## 2018-02-16 DIAGNOSIS — R10.9 RIGHT FLANK PAIN: ICD-10-CM

## 2018-02-16 DIAGNOSIS — N12 PYELONEPHRITIS: Primary | ICD-10-CM

## 2018-02-16 DIAGNOSIS — R10.9 ACUTE RIGHT FLANK PAIN: ICD-10-CM

## 2018-02-16 LAB
ALBUMIN SERPL BCP-MCNC: 4.1 G/DL
ALP SERPL-CCNC: 69 U/L
ALT SERPL W/O P-5'-P-CCNC: 23 U/L
ANION GAP SERPL CALC-SCNC: 14 MMOL/L
AST SERPL-CCNC: 24 U/L
BASOPHILS # BLD AUTO: 0.02 K/UL
BASOPHILS NFR BLD: 0.2 %
BILIRUB SERPL-MCNC: 0.5 MG/DL
BILIRUB UR QL STRIP: NEGATIVE
BUN SERPL-MCNC: 11 MG/DL
CALCIUM SERPL-MCNC: 9.9 MG/DL
CHLORIDE SERPL-SCNC: 101 MMOL/L
CLARITY UR: CLEAR
CO2 SERPL-SCNC: 25 MMOL/L
COLOR UR: YELLOW
CREAT SERPL-MCNC: 0.8 MG/DL
DIFFERENTIAL METHOD: NORMAL
EOSINOPHIL # BLD AUTO: 0.1 K/UL
EOSINOPHIL NFR BLD: 1.3 %
ERYTHROCYTE [DISTWIDTH] IN BLOOD BY AUTOMATED COUNT: 13.3 %
EST. GFR  (AFRICAN AMERICAN): >60 ML/MIN/1.73 M^2
EST. GFR  (NON AFRICAN AMERICAN): >60 ML/MIN/1.73 M^2
GLUCOSE SERPL-MCNC: 99 MG/DL
GLUCOSE UR QL STRIP: NEGATIVE
HCT VFR BLD AUTO: 40.4 %
HGB BLD-MCNC: 13.8 G/DL
HGB UR QL STRIP: ABNORMAL
KETONES UR QL STRIP: NEGATIVE
LEUKOCYTE ESTERASE UR QL STRIP: NEGATIVE
LIPASE SERPL-CCNC: 15 U/L
LYMPHOCYTES # BLD AUTO: 2.9 K/UL
LYMPHOCYTES NFR BLD: 32.8 %
MCH RBC QN AUTO: 30.9 PG
MCHC RBC AUTO-ENTMCNC: 34.2 G/DL
MCV RBC AUTO: 90 FL
MONOCYTES # BLD AUTO: 0.8 K/UL
MONOCYTES NFR BLD: 9.2 %
NEUTROPHILS # BLD AUTO: 5 K/UL
NEUTROPHILS NFR BLD: 56.5 %
NITRITE UR QL STRIP: NEGATIVE
PH UR STRIP: 7 [PH] (ref 5–8)
PLATELET # BLD AUTO: 269 K/UL
PMV BLD AUTO: 9.8 FL
POTASSIUM SERPL-SCNC: 3.6 MMOL/L
PROT SERPL-MCNC: 8.1 G/DL
PROT UR QL STRIP: NEGATIVE
RBC # BLD AUTO: 4.47 M/UL
SODIUM SERPL-SCNC: 140 MMOL/L
SP GR UR STRIP: 1.01 (ref 1–1.03)
TROPONIN I SERPL DL<=0.01 NG/ML-MCNC: <0.006 NG/ML
URN SPEC COLLECT METH UR: ABNORMAL
UROBILINOGEN UR STRIP-ACNC: NEGATIVE EU/DL
WBC # BLD AUTO: 8.77 K/UL

## 2018-02-16 PROCEDURE — 83690 ASSAY OF LIPASE: CPT

## 2018-02-16 PROCEDURE — 63600175 PHARM REV CODE 636 W HCPCS: Performed by: EMERGENCY MEDICINE

## 2018-02-16 PROCEDURE — 25000003 PHARM REV CODE 250: Performed by: EMERGENCY MEDICINE

## 2018-02-16 PROCEDURE — 80053 COMPREHEN METABOLIC PANEL: CPT

## 2018-02-16 PROCEDURE — 85025 COMPLETE CBC W/AUTO DIFF WBC: CPT

## 2018-02-16 PROCEDURE — 96361 HYDRATE IV INFUSION ADD-ON: CPT

## 2018-02-16 PROCEDURE — 99284 EMERGENCY DEPT VISIT MOD MDM: CPT | Mod: 25

## 2018-02-16 PROCEDURE — 63600175 PHARM REV CODE 636 W HCPCS: Performed by: SPECIALIST

## 2018-02-16 PROCEDURE — 96374 THER/PROPH/DIAG INJ IV PUSH: CPT

## 2018-02-16 PROCEDURE — 87086 URINE CULTURE/COLONY COUNT: CPT

## 2018-02-16 PROCEDURE — 96375 TX/PRO/DX INJ NEW DRUG ADDON: CPT

## 2018-02-16 PROCEDURE — 84484 ASSAY OF TROPONIN QUANT: CPT

## 2018-02-16 PROCEDURE — 81003 URINALYSIS AUTO W/O SCOPE: CPT

## 2018-02-16 PROCEDURE — 96376 TX/PRO/DX INJ SAME DRUG ADON: CPT

## 2018-02-16 RX ORDER — PHENAZOPYRIDINE HYDROCHLORIDE 200 MG/1
200 TABLET, FILM COATED ORAL 3 TIMES DAILY
Qty: 6 TABLET | Refills: 0 | Status: SHIPPED | OUTPATIENT
Start: 2018-02-16 | End: 2018-02-26

## 2018-02-16 RX ORDER — FENTANYL CITRATE 50 UG/ML
50 INJECTION, SOLUTION INTRAMUSCULAR; INTRAVENOUS
Status: COMPLETED | OUTPATIENT
Start: 2018-02-16 | End: 2018-02-16

## 2018-02-16 RX ORDER — TRAMADOL HYDROCHLORIDE 50 MG/1
50 TABLET ORAL EVERY 6 HOURS PRN
Qty: 15 TABLET | Refills: 0 | Status: SHIPPED | OUTPATIENT
Start: 2018-02-16 | End: 2018-02-26

## 2018-02-16 RX ORDER — KETOROLAC TROMETHAMINE 30 MG/ML
30 INJECTION, SOLUTION INTRAMUSCULAR; INTRAVENOUS
Status: COMPLETED | OUTPATIENT
Start: 2018-02-16 | End: 2018-02-16

## 2018-02-16 RX ORDER — CEPHALEXIN 500 MG/1
500 CAPSULE ORAL EVERY 12 HOURS
Qty: 20 CAPSULE | Refills: 0 | Status: SHIPPED | OUTPATIENT
Start: 2018-02-16 | End: 2018-02-21

## 2018-02-16 RX ORDER — ONDANSETRON 2 MG/ML
4 INJECTION INTRAMUSCULAR; INTRAVENOUS
Status: COMPLETED | OUTPATIENT
Start: 2018-02-16 | End: 2018-02-16

## 2018-02-16 RX ORDER — CEFTRIAXONE 1 G/1
1 INJECTION, POWDER, FOR SOLUTION INTRAMUSCULAR; INTRAVENOUS
Status: COMPLETED | OUTPATIENT
Start: 2018-02-16 | End: 2018-02-16

## 2018-02-16 RX ADMIN — FENTANYL CITRATE: 50 INJECTION, SOLUTION INTRAMUSCULAR; INTRAVENOUS at 06:02

## 2018-02-16 RX ADMIN — KETOROLAC TROMETHAMINE 30 MG: 30 INJECTION, SOLUTION INTRAMUSCULAR at 05:02

## 2018-02-16 RX ADMIN — ONDANSETRON 4 MG: 2 INJECTION INTRAMUSCULAR; INTRAVENOUS at 06:02

## 2018-02-16 RX ADMIN — ONDANSETRON 4 MG: 2 INJECTION INTRAMUSCULAR; INTRAVENOUS at 05:02

## 2018-02-16 RX ADMIN — CEFTRIAXONE SODIUM 1 G: 1 INJECTION, POWDER, FOR SOLUTION INTRAMUSCULAR; INTRAVENOUS at 08:02

## 2018-02-16 RX ADMIN — SODIUM CHLORIDE 1000 ML: 0.9 INJECTION, SOLUTION INTRAVENOUS at 05:02

## 2018-02-16 NOTE — ED NOTES
Bedside report received from GLORIA Zimmerman. Care of patient assumed at this time.    Pt AAOx3, resting in bed, side rails up x 2, call bell within reach. NAD at this time. Will continue to monitor.

## 2018-02-16 NOTE — ED PROVIDER NOTES
SCRIBE #1 NOTE: IBlue, am scribing for, and in the presence of, Dave Rodriguez Jr., MD. I have scribed the entire note.     SCRIBE #2 NOTE: I, Lev Cortes, am scribing for, and in the presence of,  Haydee Rodarte MD. I have scribed the remaining portions of the note not scribed by Scribe #1.     History      Chief Complaint   Patient presents with    Abdominal Pain     R abdominal pain and bloating onset yesterday. +nausea       Review of patient's allergies indicates:  No Known Allergies     HPI   HPI    2018, 5:04 AM   History obtained from the patient      History of Present Illness: Dorothea George is a 52 y.o. female patient who presents to the Emergency Department for an evaluation of right sided abdominal pain which onset gradually yesterday. Pt reports she began to suffer from a worsening sharp pain with radiating to her groin yesterday. Symptoms are constant and moderate in severity. Exacerbated by palpation and relieved by nothing. Associated sxs include nausea and abdominal distention. Patient denies any fever, chills, dysurias, hematuria, urinary urgency, emesis ,diarrhea, and all other sxs at this time. No further complaints or concerns at this time.     Arrival mode: Personal vehicle    PCP: Dannie Berrios MD       Past Medical History:  Past Medical History:   Diagnosis Date    Hyperlipidemia     Osteomalacia pelvis        Past Surgical History:  Past Surgical History:   Procedure Laterality Date     SECTION, CLASSIC      JOINT REPLACEMENT Left     Hip replacement    TONSILLECTOMY      TOTAL HIP ARTHROPLASTY Left     revised once as well         Family History:  Family History   Problem Relation Age of Onset    Coronary artery disease Mother     Stroke Mother     Lung cancer Father     Stroke Father     Diabetes Paternal Grandmother        Social History:  Social History     Social History Main Topics    Smoking status: Never Smoker    Smokeless tobacco: Never  Used    Alcohol use No    Drug use: No    Sexual activity: Yes     Partners: Male       ROS   Review of Systems   Constitutional: Negative for chills, diaphoresis and fever.   HENT: Negative for congestion and sore throat.    Respiratory: Negative for cough and shortness of breath.    Cardiovascular: Negative for chest pain.   Gastrointestinal: Positive for abdominal distention, abdominal pain (radiating to groin) and nausea. Negative for blood in stool, constipation, diarrhea and vomiting.   Genitourinary: Negative for dysuria and hematuria.   Musculoskeletal: Negative for back pain.   Skin: Negative for rash.   Neurological: Negative for weakness and headaches.   Hematological: Does not bruise/bleed easily.     Physical Exam      Initial Vitals [02/16/18 0457]   BP Pulse Resp Temp SpO2   (!) 173/89 82 18 97.9 °F (36.6 °C) 98 %      MAP       117          Physical Exam  Nursing Notes and Vital Signs Reviewed.  Constitutional: Patient is in no acute distress. Well-developed and well-nourished.  Head: Atraumatic. Normocephalic.  Eyes: PERRL. EOM intact. Conjunctivae are not pale. No scleral icterus.  ENT: Mucous membranes are moist. Oropharynx is clear and symmetric.    Neck: Supple. Full ROM. No lymphadenopathy.  Cardiovascular: Regular rate. Regular rhythm.  Pulmonary/Chest: No respiratory distress. Clear to auscultation bilaterally. No wheezing or rales.  Abdominal: Soft. Mildly distended. No rebound or guarding. Good bowel sounds.  Genitourinary: Right CVA tenderness  Musculoskeletal: Moves all extremities. No obvious deformities.  Skin: Warm and dry.  Neurological:  Alert, awake, and appropriate.  Normal speech.  No acute focal neurological deficits are appreciated.  Psychiatric: Normal affect. Good eye contact. Appropriate in content.    ED Course    Procedures  ED Vital Signs:  Vitals:    02/16/18 0457   BP: (!) 173/89   Pulse: 82   Resp: 18   Temp: 97.9 °F (36.6 °C)   TempSrc: Oral   SpO2: 98%   Weight:  75.8 kg (167 lb 1.7 oz)   Height: 5' (1.524 m)       Abnormal Lab Results:  Labs Reviewed   URINALYSIS - Abnormal; Notable for the following:        Result Value    Occult Blood UA Trace (*)     All other components within normal limits   CULTURE, URINE   CULTURE, URINE   CBC W/ AUTO DIFFERENTIAL   COMPREHENSIVE METABOLIC PANEL   LIPASE   TROPONIN I   TROPONIN I        All Lab Results:  Results for orders placed or performed during the hospital encounter of 02/16/18   CBC W/ AUTO DIFFERENTIAL   Result Value Ref Range    WBC 8.77 3.90 - 12.70 K/uL    RBC 4.47 4.00 - 5.40 M/uL    Hemoglobin 13.8 12.0 - 16.0 g/dL    Hematocrit 40.4 37.0 - 48.5 %    MCV 90 82 - 98 fL    MCH 30.9 27.0 - 31.0 pg    MCHC 34.2 32.0 - 36.0 g/dL    RDW 13.3 11.5 - 14.5 %    Platelets 269 150 - 350 K/uL    MPV 9.8 9.2 - 12.9 fL    Gran # (ANC) 5.0 1.8 - 7.7 K/uL    Lymph # 2.9 1.0 - 4.8 K/uL    Mono # 0.8 0.3 - 1.0 K/uL    Eos # 0.1 0.0 - 0.5 K/uL    Baso # 0.02 0.00 - 0.20 K/uL    Gran% 56.5 38.0 - 73.0 %    Lymph% 32.8 18.0 - 48.0 %    Mono% 9.2 4.0 - 15.0 %    Eosinophil% 1.3 0.0 - 8.0 %    Basophil% 0.2 0.0 - 1.9 %    Differential Method Automated    Comp. Metabolic Panel   Result Value Ref Range    Sodium 140 136 - 145 mmol/L    Potassium 3.6 3.5 - 5.1 mmol/L    Chloride 101 95 - 110 mmol/L    CO2 25 23 - 29 mmol/L    Glucose 99 70 - 110 mg/dL    BUN, Bld 11 6 - 20 mg/dL    Creatinine 0.8 0.5 - 1.4 mg/dL    Calcium 9.9 8.7 - 10.5 mg/dL    Total Protein 8.1 6.0 - 8.4 g/dL    Albumin 4.1 3.5 - 5.2 g/dL    Total Bilirubin 0.5 0.1 - 1.0 mg/dL    Alkaline Phosphatase 69 55 - 135 U/L    AST 24 10 - 40 U/L    ALT 23 10 - 44 U/L    Anion Gap 14 8 - 16 mmol/L    eGFR if African American >60 >60 mL/min/1.73 m^2    eGFR if non African American >60 >60 mL/min/1.73 m^2   Lipase   Result Value Ref Range    Lipase 15 4 - 60 U/L   Urinalysis - Clean Catch   Result Value Ref Range    Specimen UA Urine, Clean Catch     Color, UA Yellow Yellow, Straw, Court     Appearance, UA Clear Clear    pH, UA 7.0 5.0 - 8.0    Specific Gravity, UA 1.015 1.005 - 1.030    Protein, UA Negative Negative    Glucose, UA Negative Negative    Ketones, UA Negative Negative    Bilirubin (UA) Negative Negative    Occult Blood UA Trace (A) Negative    Nitrite, UA Negative Negative    Urobilinogen, UA Negative <2.0 EU/dL    Leukocytes, UA Negative Negative   Troponin I   Result Value Ref Range    Troponin I <0.006 0.000 - 0.026 ng/mL         Imaging Results:  Imaging Results          CT Renal Stone Study ABD Pelvis WO (Final result)  Result time 02/16/18 07:56:34    Final result by Richie Le MD (02/16/18 07:56:34)                 Impression:             1.  No urolithiasis or hydronephrosis.  2.  No acute finding in the abdomen or pelvis.  Multiple chronic findings as detailed above.    All CT scans at this facility use dose modulation, iterative reconstruction and/or weight based dosing when appropriate to reduce radiation dose to as low as reasonably achievable.      Electronically signed by: RICHIE LE MD  Date:     02/16/18  Time:    07:56              Narrative:    Exam: CT abdomen and pelvis without intravenous contrast.    Technique: Axial CT images performed through the abdomen and pelvis without intravenous contrast. Multiplanar reformats were performed and interpreted.    Comparison: CT abdomen and pelvis, 05/02/2016    Clinical History: right flank pain .   Abdominal pain    Findings:         There is scarring and/or fibrotic changes in the lung bases, right greater than left.    No urolithiasis, hydronephrosis, or perinephric stranding.  There is diffuse fatty infiltration of the liver.  The spleen, adrenal glands, pancreas, and kidneys have a normal unenhanced appearance.   The gallbladder is unremarkable.  No free fluid, free air, or inflammatory change.     The bowel is nondistended and within normal limits.    The abdominal aorta is normal in caliber.    The urinary bladder  is unremarkable. There is a partially calcified left adnexal mass measuring 4.1 x 1.8 cm which appears stable dating back to 07/22/2015.    The patient has a left hip arthroplasty which causes streak artifact in the pelvis.                           Per [facility], pt's CT results: Limited evaluation the left distal ureter due to streak artifact from a left hip arthroplasty. Nonspecific bilateral perinephric stranding. No renal calculus or hydronephrosis. Fatty liver.           The Emergency Provider reviewed the vital signs and test results, which are outlined above.    ED Discussion     6:19 AM: Dr. Rodriguez transfers care of pt to Dr. Rodarte, pending labs and imaging results.    8:19 AM: Dr. Rodarte reassessed pt at this time.  Pt states her condition has imrpoved at this time but still reports abd pain. Pt is laying comfortably in ED bed and in NAD. Pt is awake, alert, and oriented. On re-evaluation, pt has tenderness across her abdomen, mainly to her suprapubic region. There is no point tenderness to her RLQ. Instructed pt to return to the ED if she has any fever, the pain localizes to her RLQ, or her sxs get worse. Offered pt a repeat CT with contrast while she is still here in the ED. Pt states she would rather wait for a repeat CT if necessary. Instructed pt  Discussed with pt all pertinent ED information and results. Discussed pt dx and plan of tx. Gave pt all f/u and return to the ED instructions. All questions and concerns were addressed at this time. Pt expresses understanding of information and instructions, and is comfortable with plan to discharge. Pt is stable for discharge.    I discussed with patient and/or family/caretaker that evaluation in the ED does not suggest any emergent or life threatening medical conditions requiring immediate intervention beyond what was provided in the ED, and I believe patient is safe for discharge.  Regardless, an unremarkable evaluation in the ED does not preclude  the development or presence of a serious of life threatening condition. As such, patient was instructed to return immediately for any worsening or change in current symptoms.      ED Medication(s):  Medications   sodium chloride 0.9% bolus 1,000 mL (1,000 mLs Intravenous New Bag 2/16/18 0543)   ketorolac injection 30 mg (30 mg Intravenous Given 2/16/18 0542)   ondansetron injection 4 mg (4 mg Intravenous Given 2/16/18 0543)   fentaNYL injection 50 mcg ( Intravenous Given 2/16/18 0647)   ondansetron injection 4 mg (4 mg Intravenous Given 2/16/18 0646)   cefTRIAXone injection 1 g (1 g Intravenous Given 2/16/18 0807)       New Prescriptions    CEPHALEXIN (KEFLEX) 500 MG CAPSULE    Take 1 capsule (500 mg total) by mouth every 12 (twelve) hours.    TRAMADOL (ULTRAM) 50 MG TABLET    Take 1 tablet (50 mg total) by mouth every 6 (six) hours as needed for Pain.       Follow-up Information     Schedule an appointment as soon as possible for a visit  with Dannie Berrios MD.    Specialty:  Internal Medicine  Contact information:  [address]  [address]  [facility] [id number] [phone]             [facility]    Specialty:  Emergency Medicine  Why:  If symptoms worsen  Contact information:  [address]  [facility] [address] [phone]                   Medical Decision Making    Medical Decision Making:   Clinical Tests:   Lab Tests: Reviewed and Ordered  Radiological Study: Ordered and Reviewed           Scribe Attestation:   Scribe #1: I performed the above scribed service and the documentation accurately describes the services I performed. I attest to the accuracy of the note.    Attending:   Physician Attestation Statement for Scribe #1: I, Dave Rodriguez Jr., MD, personally performed the services described in this documentation, as scribed by Blue Jackson, in my presence, and it is both accurate and complete.         Attending Attestation:           Physician Attestation  for Scribe:    Physician Attestation Statement for Scribe #2: I, Haydee Rodarte MD, reviewed documentation, as scribed by Lev Cortes in my presence, and it is both accurate and complete. I also acknowledge and confirm the content of the note done by Millaibe #1.          Clinical Impression       ICD-10-CM ICD-9-CM   1. Pyelonephritis N12 590.80   2. Right flank pain R10.9 789.09   3. Acute right flank pain R10.9 789.09     338.19       Disposition:   Disposition: Discharged  Condition: Stable         Haydee Rodarte MD  02/16/18 [id number]

## 2018-02-18 LAB
BACTERIA UR CULT: NORMAL
BACTERIA UR CULT: NORMAL

## 2019-05-14 DIAGNOSIS — Z12.11 COLON CANCER SCREENING: ICD-10-CM

## 2019-08-15 ENCOUNTER — PATIENT OUTREACH (OUTPATIENT)
Dept: ADMINISTRATIVE | Facility: HOSPITAL | Age: 54
End: 2019-08-15

## 2021-02-03 ENCOUNTER — HOSPITAL ENCOUNTER (OUTPATIENT)
Dept: RADIOLOGY | Facility: HOSPITAL | Age: 56
Discharge: HOME | End: 2021-02-03
Attending: INTERNAL MEDICINE
Payer: COMMERCIAL

## 2021-02-03 ENCOUNTER — OFFICE VISIT (OUTPATIENT)
Dept: INTERNAL MEDICINE | Facility: CLINIC | Age: 56
End: 2021-02-03
Payer: COMMERCIAL

## 2021-02-03 VITALS
OXYGEN SATURATION: 98 % | HEART RATE: 94 BPM | WEIGHT: 169.56 LBS | DIASTOLIC BLOOD PRESSURE: 72 MMHG | BODY MASS INDEX: 33.29 KG/M2 | HEIGHT: 60 IN | SYSTOLIC BLOOD PRESSURE: 142 MMHG | TEMPERATURE: 97 F

## 2021-02-03 DIAGNOSIS — M16.11 PRIMARY OSTEOARTHRITIS OF RIGHT HIP: ICD-10-CM

## 2021-02-03 DIAGNOSIS — E78.00 PURE HYPERCHOLESTEROLEMIA: ICD-10-CM

## 2021-02-03 DIAGNOSIS — Z01.818 PREOP EXAMINATION: Primary | ICD-10-CM

## 2021-02-03 DIAGNOSIS — Z12.31 OTHER SCREENING MAMMOGRAM: ICD-10-CM

## 2021-02-03 DIAGNOSIS — Z01.818 PREOP EXAMINATION: ICD-10-CM

## 2021-02-03 PROCEDURE — 99999 PR PBB SHADOW E&M-EST. PATIENT-LVL III: CPT | Mod: PBBFAC,,, | Performed by: INTERNAL MEDICINE

## 2021-02-03 PROCEDURE — 93005 ELECTROCARDIOGRAM TRACING: CPT

## 2021-02-03 PROCEDURE — 99203 OFFICE O/P NEW LOW 30 MIN: CPT | Mod: S$GLB,,, | Performed by: INTERNAL MEDICINE

## 2021-02-03 PROCEDURE — 1125F PR PAIN SEVERITY QUANTIFIED, PAIN PRESENT: ICD-10-PCS | Mod: S$GLB,,, | Performed by: INTERNAL MEDICINE

## 2021-02-03 PROCEDURE — 99203 PR OFFICE/OUTPT VISIT, NEW, LEVL III, 30-44 MIN: ICD-10-PCS | Mod: S$GLB,,, | Performed by: INTERNAL MEDICINE

## 2021-02-03 PROCEDURE — 99999 PR PBB SHADOW E&M-EST. PATIENT-LVL III: ICD-10-PCS | Mod: PBBFAC,,, | Performed by: INTERNAL MEDICINE

## 2021-02-03 PROCEDURE — 3008F PR BODY MASS INDEX (BMI) DOCUMENTED: ICD-10-PCS | Mod: CPTII,S$GLB,, | Performed by: INTERNAL MEDICINE

## 2021-02-03 PROCEDURE — 87081 CULTURE SCREEN ONLY: CPT

## 2021-02-03 PROCEDURE — 93010 EKG 12-LEAD: ICD-10-PCS | Mod: S$GLB,,, | Performed by: INTERNAL MEDICINE

## 2021-02-03 PROCEDURE — 3008F BODY MASS INDEX DOCD: CPT | Mod: CPTII,S$GLB,, | Performed by: INTERNAL MEDICINE

## 2021-02-03 PROCEDURE — 71046 XR CHEST PA AND LATERAL: ICD-10-PCS | Mod: 26,,, | Performed by: RADIOLOGY

## 2021-02-03 PROCEDURE — 1125F AMNT PAIN NOTED PAIN PRSNT: CPT | Mod: S$GLB,,, | Performed by: INTERNAL MEDICINE

## 2021-02-03 PROCEDURE — 93010 ELECTROCARDIOGRAM REPORT: CPT | Mod: S$GLB,,, | Performed by: INTERNAL MEDICINE

## 2021-02-03 PROCEDURE — 71046 X-RAY EXAM CHEST 2 VIEWS: CPT | Mod: TC,PO

## 2021-02-03 PROCEDURE — 71046 X-RAY EXAM CHEST 2 VIEWS: CPT | Mod: 26,,, | Performed by: RADIOLOGY

## 2021-02-03 RX ORDER — HYDROCODONE BITARTRATE AND ACETAMINOPHEN 5; 325 MG/1; MG/1
1 TABLET ORAL 2 TIMES DAILY PRN
COMMUNITY
Start: 2021-01-29

## 2021-02-03 RX ORDER — MELOXICAM 15 MG/1
TABLET ORAL
COMMUNITY
Start: 2020-11-30

## 2021-02-05 LAB — MRSA SPEC QL CULT: NORMAL

## 2021-02-10 DIAGNOSIS — Z12.31 OTHER SCREENING MAMMOGRAM: ICD-10-CM

## 2021-02-17 ENCOUNTER — TELEPHONE (OUTPATIENT)
Dept: INTERNAL MEDICINE | Facility: CLINIC | Age: 56
End: 2021-02-17

## 2021-02-22 ENCOUNTER — OFFICE VISIT (OUTPATIENT)
Dept: INTERNAL MEDICINE | Facility: CLINIC | Age: 56
End: 2021-02-22
Payer: COMMERCIAL

## 2021-02-22 DIAGNOSIS — K52.9 GASTROENTERITIS: Primary | ICD-10-CM

## 2021-02-22 PROCEDURE — 99212 PR OFFICE/OUTPT VISIT, EST, LEVL II, 10-19 MIN: ICD-10-PCS | Mod: 95,,, | Performed by: INTERNAL MEDICINE

## 2021-02-22 PROCEDURE — 99212 OFFICE O/P EST SF 10 MIN: CPT | Mod: 95,,, | Performed by: INTERNAL MEDICINE

## 2021-05-20 ENCOUNTER — PATIENT OUTREACH (OUTPATIENT)
Dept: ADMINISTRATIVE | Facility: HOSPITAL | Age: 56
End: 2021-05-20

## 2021-06-15 ENCOUNTER — TELEPHONE (OUTPATIENT)
Dept: ADMINISTRATIVE | Facility: HOSPITAL | Age: 56
End: 2021-06-15

## 2021-09-24 ENCOUNTER — TELEPHONE (OUTPATIENT)
Dept: INTERNAL MEDICINE | Facility: CLINIC | Age: 56
End: 2021-09-24

## 2021-10-19 ENCOUNTER — TELEPHONE (OUTPATIENT)
Dept: ADMINISTRATIVE | Facility: HOSPITAL | Age: 56
End: 2021-10-19

## 2022-04-13 DIAGNOSIS — Z12.31 OTHER SCREENING MAMMOGRAM: ICD-10-CM

## 2022-05-13 ENCOUNTER — PATIENT OUTREACH (OUTPATIENT)
Dept: ADMINISTRATIVE | Facility: HOSPITAL | Age: 57
End: 2022-05-13
Payer: COMMERCIAL

## 2022-05-13 NOTE — PROGRESS NOTES
Not Seen in 12 months report: Attempting to contact pt to schedule overdue annual exam. Unable to reach patient at this time. Left voicemail.

## 2022-06-02 ENCOUNTER — PATIENT OUTREACH (OUTPATIENT)
Dept: ADMINISTRATIVE | Facility: HOSPITAL | Age: 57
End: 2022-06-02
Payer: COMMERCIAL

## 2022-09-02 ENCOUNTER — PATIENT OUTREACH (OUTPATIENT)
Dept: ADMINISTRATIVE | Facility: HOSPITAL | Age: 57
End: 2022-09-02
Payer: COMMERCIAL

## 2022-09-16 ENCOUNTER — PATIENT OUTREACH (OUTPATIENT)
Dept: ADMINISTRATIVE | Facility: HOSPITAL | Age: 57
End: 2022-09-16
Payer: COMMERCIAL

## 2022-09-16 NOTE — PROGRESS NOTES
Working the Non-Compliant Mammogram Report: Called patient to discuss scheduling a mammogram appointment Left a message

## 2022-11-16 ENCOUNTER — PATIENT OUTREACH (OUTPATIENT)
Dept: ADMINISTRATIVE | Facility: HOSPITAL | Age: 57
End: 2022-11-16
Payer: COMMERCIAL

## 2022-12-14 ENCOUNTER — PATIENT OUTREACH (OUTPATIENT)
Dept: ADMINISTRATIVE | Facility: HOSPITAL | Age: 57
End: 2022-12-14
Payer: COMMERCIAL

## 2022-12-20 DIAGNOSIS — Z12.31 OTHER SCREENING MAMMOGRAM: ICD-10-CM

## 2023-01-05 ENCOUNTER — PATIENT OUTREACH (OUTPATIENT)
Dept: ADMINISTRATIVE | Facility: HOSPITAL | Age: 58
End: 2023-01-05
Payer: COMMERCIAL

## 2023-01-26 ENCOUNTER — PATIENT OUTREACH (OUTPATIENT)
Dept: ADMINISTRATIVE | Facility: HOSPITAL | Age: 58
End: 2023-01-26
Payer: COMMERCIAL

## 2023-01-26 NOTE — PROGRESS NOTES
Attempted to contact patient by phone for a MAMMOGRAM SCHEDULING, was able to leave voice mail message.  Updated Care Everywhere and Team.

## 2023-01-30 ENCOUNTER — PATIENT OUTREACH (OUTPATIENT)
Dept: ADMINISTRATIVE | Facility: HOSPITAL | Age: 58
End: 2023-01-30
Payer: COMMERCIAL

## 2025-01-01 ENCOUNTER — TELEPHONE (OUTPATIENT)
Dept: TRANSPLANT | Facility: CLINIC | Age: 60
End: 2025-01-01
Payer: COMMERCIAL

## 2025-08-01 ENCOUNTER — TELEPHONE (OUTPATIENT)
Dept: INTERNAL MEDICINE | Facility: CLINIC | Age: 60
End: 2025-08-01
Payer: COMMERCIAL

## 2025-08-01 NOTE — TELEPHONE ENCOUNTER
Copied from CRM #[id number]. Topic: Appointments - Appointment Scheduling  >> Aug 1, 2025 11:39 AM Nelia wrote:  Type:  Patient Needing To Schedule   Who Called:Dorothea   Patient Name: Dorothea   What is the appointment for?: Establish care, referral   Preferred Location and Doctor: Dr ALPESH Isbell, [place]   Would the patient rather a call back or a response via [facility]?  Call back  Best Call Back Number:[phone] (home)    Additional Information:     Colten,  NATALIA

## 2025-08-06 ENCOUNTER — OFFICE VISIT (OUTPATIENT)
Dept: INTERNAL MEDICINE | Facility: CLINIC | Age: 60
End: 2025-08-06
Payer: COMMERCIAL

## 2025-08-06 ENCOUNTER — LAB VISIT (OUTPATIENT)
Dept: LAB | Facility: HOSPITAL | Age: 60
End: 2025-08-06
Attending: FAMILY MEDICINE
Payer: COMMERCIAL

## 2025-08-06 ENCOUNTER — TELEPHONE (OUTPATIENT)
Dept: INTERNAL MEDICINE | Facility: CLINIC | Age: 60
End: 2025-08-06
Payer: COMMERCIAL

## 2025-08-06 VITALS
BODY MASS INDEX: 24.03 KG/M2 | HEIGHT: 60 IN | TEMPERATURE: 99 F | WEIGHT: 122.38 LBS | SYSTOLIC BLOOD PRESSURE: 128 MMHG | DIASTOLIC BLOOD PRESSURE: 84 MMHG | HEART RATE: 125 BPM | OXYGEN SATURATION: 95 %

## 2025-08-06 DIAGNOSIS — F41.0 PANIC DISORDER: ICD-10-CM

## 2025-08-06 DIAGNOSIS — I27.20 PULMONARY HYPERTENSION: ICD-10-CM

## 2025-08-06 DIAGNOSIS — E78.00 PURE HYPERCHOLESTEROLEMIA: ICD-10-CM

## 2025-08-06 DIAGNOSIS — R00.0 TACHYCARDIA: ICD-10-CM

## 2025-08-06 DIAGNOSIS — J84.10 PULMONARY FIBROSIS: Primary | ICD-10-CM

## 2025-08-06 DIAGNOSIS — J96.21 ACUTE ON CHRONIC RESPIRATORY FAILURE WITH HYPOXIA: ICD-10-CM

## 2025-08-06 PROBLEM — R04.0 EPISTAXIS: Status: ACTIVE | Noted: 2025-07-14

## 2025-08-06 LAB
ABSOLUTE EOSINOPHIL (OHS): 0.03 K/UL
ABSOLUTE MONOCYTE (OHS): 0.35 K/UL (ref 0.3–1)
ABSOLUTE NEUTROPHIL COUNT (OHS): 8.71 K/UL (ref 1.8–7.7)
ALBUMIN SERPL BCP-MCNC: 3.9 G/DL (ref 3.5–5.2)
ALP SERPL-CCNC: 61 UNIT/L (ref 40–150)
ALT SERPL W/O P-5'-P-CCNC: 11 UNIT/L (ref 0–55)
ANION GAP (OHS): 15 MMOL/L (ref 8–16)
AST SERPL-CCNC: 23 UNIT/L (ref 0–50)
BASOPHILS # BLD AUTO: 0.04 K/UL
BASOPHILS NFR BLD AUTO: 0.4 %
BILIRUB SERPL-MCNC: 0.7 MG/DL (ref 0.1–1)
BUN SERPL-MCNC: 12 MG/DL (ref 6–20)
CALCIUM SERPL-MCNC: 10 MG/DL (ref 8.7–10.5)
CHLORIDE SERPL-SCNC: 101 MMOL/L (ref 95–110)
CHOLEST SERPL-MCNC: 303 MG/DL (ref 120–199)
CHOLEST/HDLC SERPL: 4.4 {RATIO} (ref 2–5)
CO2 SERPL-SCNC: 24 MMOL/L (ref 23–29)
CREAT SERPL-MCNC: 0.7 MG/DL (ref 0.5–1.4)
ERYTHROCYTE [DISTWIDTH] IN BLOOD BY AUTOMATED COUNT: 15.5 % (ref 11.5–14.5)
GFR SERPLBLD CREATININE-BSD FMLA CKD-EPI: >60 ML/MIN/1.73/M2
GLUCOSE SERPL-MCNC: 130 MG/DL (ref 70–110)
HCT VFR BLD AUTO: 44.8 % (ref 37–48.5)
HDLC SERPL-MCNC: 69 MG/DL (ref 40–75)
HDLC SERPL: 22.8 % (ref 20–50)
HGB BLD-MCNC: 14.4 GM/DL (ref 12–16)
IMM GRANULOCYTES # BLD AUTO: 0.09 K/UL (ref 0–0.04)
IMM GRANULOCYTES NFR BLD AUTO: 0.9 % (ref 0–0.5)
LDLC SERPL CALC-MCNC: 209.2 MG/DL (ref 63–159)
LYMPHOCYTES # BLD AUTO: 1.24 K/UL (ref 1–4.8)
MCH RBC QN AUTO: 30.3 PG (ref 27–31)
MCHC RBC AUTO-ENTMCNC: 32.1 G/DL (ref 32–36)
MCV RBC AUTO: 94 FL (ref 82–98)
NONHDLC SERPL-MCNC: 234 MG/DL
NUCLEATED RBC (/100WBC) (OHS): 0 /100 WBC
PLATELET # BLD AUTO: 357 K/UL (ref 150–450)
PMV BLD AUTO: 9.5 FL (ref 9.2–12.9)
POTASSIUM SERPL-SCNC: 4.3 MMOL/L (ref 3.5–5.1)
PROT SERPL-MCNC: 8.2 GM/DL (ref 6–8.4)
RBC # BLD AUTO: 4.75 M/UL (ref 4–5.4)
RELATIVE EOSINOPHIL (OHS): 0.3 %
RELATIVE LYMPHOCYTE (OHS): 11.9 % (ref 18–48)
RELATIVE MONOCYTE (OHS): 3.3 % (ref 4–15)
RELATIVE NEUTROPHIL (OHS): 83.2 % (ref 38–73)
SODIUM SERPL-SCNC: 140 MMOL/L (ref 136–145)
TRIGL SERPL-MCNC: 124 MG/DL (ref 30–150)
WBC # BLD AUTO: 10.46 K/UL (ref 3.9–12.7)

## 2025-08-06 PROCEDURE — 3074F SYST BP LT 130 MM HG: CPT | Mod: CPTII,S$GLB,, | Performed by: FAMILY MEDICINE

## 2025-08-06 PROCEDURE — 99204 OFFICE O/P NEW MOD 45 MIN: CPT | Mod: S$GLB,,, | Performed by: FAMILY MEDICINE

## 2025-08-06 PROCEDURE — 36415 COLL VENOUS BLD VENIPUNCTURE: CPT | Mod: PO

## 2025-08-06 PROCEDURE — 80053 COMPREHEN METABOLIC PANEL: CPT

## 2025-08-06 PROCEDURE — 3008F BODY MASS INDEX DOCD: CPT | Mod: CPTII,S$GLB,, | Performed by: FAMILY MEDICINE

## 2025-08-06 PROCEDURE — 3079F DIAST BP 80-89 MM HG: CPT | Mod: CPTII,S$GLB,, | Performed by: FAMILY MEDICINE

## 2025-08-06 PROCEDURE — 99999 PR PBB SHADOW E&M-EST. PATIENT-LVL V: CPT | Mod: PBBFAC,,, | Performed by: FAMILY MEDICINE

## 2025-08-06 PROCEDURE — 82465 ASSAY BLD/SERUM CHOLESTEROL: CPT

## 2025-08-06 PROCEDURE — 85025 COMPLETE CBC W/AUTO DIFF WBC: CPT

## 2025-08-06 PROCEDURE — G2211 COMPLEX E/M VISIT ADD ON: HCPCS | Mod: S$GLB,,, | Performed by: FAMILY MEDICINE

## 2025-08-06 PROCEDURE — 1159F MED LIST DOCD IN RCRD: CPT | Mod: CPTII,S$GLB,, | Performed by: FAMILY MEDICINE

## 2025-08-06 RX ORDER — PREDNISONE 10 MG/1
10 TABLET ORAL DAILY
COMMUNITY
Start: 2025-07-16 | End: 2025-08-13

## 2025-08-06 RX ORDER — CETIRIZINE HYDROCHLORIDE 10 MG/1
10 TABLET ORAL EVERY MORNING
COMMUNITY
Start: 2025-07-30 | End: 2026-07-30

## 2025-08-06 RX ORDER — FLUTICASONE PROPIONATE 50 MCG
2 SPRAY, SUSPENSION (ML) NASAL
COMMUNITY
Start: 2025-07-30

## 2025-08-06 RX ORDER — GABAPENTIN 300 MG/1
300 CAPSULE ORAL 3 TIMES DAILY
Qty: 90 CAPSULE | Refills: 3 | Status: SHIPPED | OUTPATIENT
Start: 2025-08-06 | End: 2025-09-05

## 2025-08-06 RX ORDER — LORAZEPAM 0.5 MG/1
0.5 TABLET ORAL EVERY 4 HOURS PRN
COMMUNITY
Start: 2025-07-15 | End: 2025-08-06 | Stop reason: SDUPTHER

## 2025-08-06 RX ORDER — PANTOPRAZOLE SODIUM 40 MG/1
40 TABLET, DELAYED RELEASE ORAL DAILY
COMMUNITY
Start: 2025-07-17 | End: 2025-08-06 | Stop reason: SDUPTHER

## 2025-08-06 RX ORDER — MYCOPHENOLATE MOFETIL 500 MG/1
500 TABLET ORAL 2 TIMES DAILY
COMMUNITY

## 2025-08-06 RX ORDER — GABAPENTIN 300 MG/1
300 CAPSULE ORAL NIGHTLY
COMMUNITY
Start: 2025-07-15 | End: 2025-08-06 | Stop reason: SDUPTHER

## 2025-08-06 RX ORDER — LORAZEPAM 0.5 MG/1
0.5 TABLET ORAL EVERY 4 HOURS PRN
Qty: 30 TABLET | Refills: 1 | Status: SHIPPED | OUTPATIENT
Start: 2025-08-06

## 2025-08-06 RX ORDER — SERTRALINE HYDROCHLORIDE 50 MG/1
50 TABLET, FILM COATED ORAL DAILY
Qty: 30 TABLET | Refills: 2 | Status: SHIPPED | OUTPATIENT
Start: 2025-08-06 | End: 2026-08-06

## 2025-08-06 RX ORDER — PANTOPRAZOLE SODIUM 40 MG/1
40 TABLET, DELAYED RELEASE ORAL DAILY
Qty: 30 TABLET | Refills: 5 | Status: SHIPPED | OUTPATIENT
Start: 2025-08-06 | End: 2025-09-05

## 2025-08-06 NOTE — TELEPHONE ENCOUNTER
Spoke with pt, let her know rx unable to be filled until pt is seen by provider. Pt verbalized understanding and rescheduled appt for today.     Copied from CRM #[id number]. Topic: Medications - New Medication Request  >> Aug 6, 2025 10:13 AM Summer wrote:  Type:  Home Health Call Request     Who Called: Shadia/ Home health nurse   Message for Patient: Nurse   What this is regarding?: Refill for lorazepam .5 mg   Best Call Back Number: [phone]  Additional Information: Pt was given meds at the hospital and she needs r/s. Pt has upcoming appt on 8/12.   [id number] - [place], [place] - [address] [facility] [address]  [address]  [address] [place] [address]  Phone: [phone] Fax: [phone]

## 2025-08-07 ENCOUNTER — TELEPHONE (OUTPATIENT)
Dept: TRANSPLANT | Facility: CLINIC | Age: 60
End: 2025-08-07
Payer: COMMERCIAL

## 2025-08-08 ENCOUNTER — PATIENT OUTREACH (OUTPATIENT)
Dept: ADMINISTRATIVE | Facility: OTHER | Age: 60
End: 2025-08-08
Payer: COMMERCIAL

## 2025-08-08 ENCOUNTER — TELEPHONE (OUTPATIENT)
Dept: TRANSPLANT | Facility: CLINIC | Age: 60
End: 2025-08-08
Payer: COMMERCIAL

## 2025-08-08 NOTE — PROGRESS NOTES
"CHW - Initial Contact    This Community Health Worker verified the Social Determinant of Health questionnaire with patient via telephone today.   Pt identified barriers of most importance are: The CHW contacted pt to address SDOH needs. The pt reported that she is trying to get on disability and is currently on the waiting list for a lung transplant. The pt stated that she is waiting to be approved by her insurance to cover her lung medication "Tyvaso DPI", due to it being expensive. The pt stated that she is interested in receiving transportation resources to get to her medical appointments, due to her  having to take off of work to drive her to them. The CHW emailed the pt the "[facility]" transportation service resource. The CHW confirmed with the pt if any additional needs are present at this time, but the pt denied.   Support and Services: Transportation resources  Other information discussed the patient needs / wants help with: No additional needs   Follow up required: Yes  Follow-up Outreach - Due: 8/15/2025     "

## 2025-08-09 NOTE — PROGRESS NOTES
"Subjective:      Patient ID: Dorothea George is a 60 y.o. female.    Chief Complaint: Establish Care      History of Present Illness    CHIEF COMPLAINT:  Ms. George presents today to establish care.    RESPIRATORY:  She experiences severe shortness of breath with minimal exertion including talking or walking short distances from bed to desk or bathroom. She requires oxygen supplementation using a machine and mask to manage oxygen levels. After physical activity, she must lay down to recover. She has significant nighttime sleep difficulties with 20-30 minute coughing fits involving phlegm production. She describes constant struggle to catch breath throughout the day and sleeps with head elevated using double pillows to manage symptoms.    CARDIOVASCULAR:  Her heart is consistently racing with minimal exertion, describing it as "galloping all the time." Any activity causes immediate heart rate elevation. She experiences panic attacks triggered by low oxygen levels during which she feels like she is going to black out with full-body sensations of being "on fire" with pain, particularly in her back.    LUNG TRANSPLANT EVALUATION:  She reports ongoing lung transplant evaluation process with multiple communication challenges. She was initially directed to a [facility] but her pulmonologist stated the hospital had not contacted him. Her  called and was informed they are not accepting patients until September. Her pulmonologist subsequently mentioned [facility] in [place] as another potential transplant center but she currently lacks contact information for the transplant team and has not been contacted despite approximately two weeks passing.    ENT:  She reports persistent nasal symptoms with constant nasal discharge. Previously prescribed medication for runny nose has been ineffective. She attributes potential nasal irritation to recent nasal cannula use. She has ongoing issues with vertigo related to " ear crystals requiring Epley maneuver intervention. MRI of the head was performed to evaluate ear-related concerns.    GASTROINTESTINAL:  She reports significant eating difficulties related to respiratory challenges, stating that eating requires substantial energy expenditure and breathing takes priority over eating. She is consuming Ensure supplements but only able to find 250-calorie versions. She has a history of acid reflux during recent hospitalization with episodes of burping up food and takes pantoprazole 2 tablets daily for frequent burping.    MUSCULOSKELETAL:  She has bilateral artificial hip joints. She was previousl  y taking Advil with Gabapentin for leg discomfort but discontinued Advil after learning about potential medication interactions. She is participating in home physical therapy performing exercises up to 20 repetitions and reports improved mobility, noting she can walk better after sessions with improved blood flow and reduced leg numbness.    ANXIETY:  She experiences significant anxiety that impacts daily functioning and was previously prescribed Buspar with uncertain effectiveness. She takes anxiety medication and Gabapentin which appear to help lower her heart rate. She expresses significant concerns about infection risk and heightened anxiety about potential exposure to germs, particularly with her 13-year-old grandson starting eighth grade and living in her household.    AUTOIMMUNE DISEASE:  She is scheduled for rheumatology consultation with Dr. Sofya Prieto to further evaluate autoimmune conditions. Recent testing revealed positive results for Sjogren's syndrome.    CURRENT MEDICATIONS:  She is taking CellCept 2 tablets morning and night which reduces her immune system, steroids currently being weaned down to 2 tablets daily, pantoprazole 2 tablets daily for frequent burping, and Gabapentin as needed for pain particularly before and after physical therapy. She expresses concern about pill  burden.    ADVANCED DIRECTIVES:  She has explicitly stated her wishes not to be placed on a ventilator or to be resuscitated. She expresses a clear desire to avoid prolonging her current medical condition, stating that if a transplant is not possible, she would prefer to die rather than continue in her present state.        Past Medical History:   Diagnosis Date    Hyperlipidemia     Osteomalacia pelvis     Pulmonary fibrosis     Pulmonary Fibrosis Hypoxia          Past Surgical History:   Procedure Laterality Date     SECTION, CLASSIC      JOINT REPLACEMENT Left     Hip replacement    TONSILLECTOMY      TOTAL HIP ARTHROPLASTY Bilateral     revised once as well     Family History   Problem Relation Name Age of Onset    Coronary artery disease Mother      Stroke Mother      Lung cancer Father      Stroke Father      Diabetes Paternal Grandmother       Social History[1]  Review of patient's allergies indicates:  No Known Allergies    Review of Systems   Constitutional:  Positive for malaise/fatigue.   HENT:  Positive for congestion.    Respiratory:  Positive for sputum production and shortness of breath.    Musculoskeletal:  Positive for back pain, joint pain and myalgias.   Psychiatric/Behavioral:  The patient is nervous/anxious.      Objective:       /84 (BP Location: Left arm, Patient Position: Sitting)   Pulse (!) 125   Temp 98.7 °F (37.1 °C) (Tympanic)   Ht 5' (1.524 m)   Wt 55.5 kg (122 lb 5.7 oz)   SpO2 95%   BMI 23.90 kg/m²   Physical Exam    Lungs: Decreased breath sounds in right lung.        Physical Exam  Constitutional:       General: She is in acute distress (visibly SOB, difficulty speaking).      Appearance: Normal appearance. She is well-developed. She is ill-appearing. She is not diaphoretic.      Comments: O2 nasal cannula   Cardiovascular:      Rate and Rhythm: Normal rate and regular rhythm.      Heart sounds: Normal heart sounds.   Pulmonary:      Effort: Respiratory distress  present.   Neurological:      Mental Status: She is alert and oriented to person, place, and time.   Psychiatric:         Mood and Affect: Mood normal.         Behavior: Behavior normal.         Thought Content: Thought content normal.         Judgment: Judgment normal.         Assessment:     1. Pulmonary fibrosis    2. Pulmonary hypertension    3. Acute on chronic respiratory failure with hypoxia    4. Tachycardia    5. Pure hypercholesterolemia    6. Panic disorder      Plan:   Assessment & Plan    MEDICAL DECISION MAKING:  - Assessed significant respiratory distress and cardiac symptoms, noting significant limitations in daily activities and oxygen dependence.  - Evaluated current medication regimen, including steroid taper and immunosuppressants.  - Reviewed recent lab results, noting improvement in some values.    PATIENT EDUCATION:  - Explained risks of lorazepam use, including potential for respiratory suppression.    ACTION ITEMS/LIFESTYLE:  - Ms. George to monitor effectiveness of Zoloft for anxiety control.  - Ms. George to consider discontinuing pantoprazole if no noticeable difference after a week of stopping.    MEDICATIONS:  - Initiated Zoloft for daily anxiety management as a safer alternative to lorazepam, explaining it is a daily medication that does not affect respiration.  - Assessed current use of pantoprazole for reflux symptoms and burping symptoms.  - Ms. George to use lorazepam sparingly, not more than 1 tablet at a time.  - Continued Gabapentin.  - Continued pantoprazole (Protonix) with option to discontinue if ineffective.    ORDERS:  - Ordered new set of labs: metabolic panel, lipid panel, CBC, and calcium level check.    REFERRALS:  - Referred to [facility] transplant team.  - Referred for outpatient case management.    FOLLOW UP:  - Contact the office in about a month to report on Zoloft effectiveness via [employer].        Pulmonary fibrosis  -     Ambulatory referral/consult to  Transplant, Lung; Future; Expected date: 08/13/2025  -     Ambulatory referral/consult to Outpatient Case Management    Pulmonary hypertension  -     Ambulatory referral/consult to Transplant, Lung; Future; Expected date: 08/13/2025  -     Ambulatory referral/consult to Outpatient Case Management    Acute on chronic respiratory failure with hypoxia  -     Ambulatory referral/consult to Transplant, Lung; Future; Expected date: 08/13/2025  -     Ambulatory referral/consult to Outpatient Case Management  -     CBC Auto Differential; Future; Expected date: 08/06/2025  -     Comprehensive Metabolic Panel; Future; Expected date: 08/06/2025  -     Lipid Panel; Future; Expected date: 08/06/2025    Tachycardia    Pure hypercholesterolemia  -     CBC Auto Differential; Future; Expected date: 08/06/2025  -     Comprehensive Metabolic Panel; Future; Expected date: 08/06/2025  -     Lipid Panel; Future; Expected date: 08/06/2025    Panic disorder  -     LORazepam (ATIVAN) 0.5 MG tablet; Take 1 tablet (0.5 mg total) by mouth every 4 (four) hours as needed for Anxiety.  Dispense: 30 tablet; Refill: 1    Other orders  -     sertraline (ZOLOFT) 50 MG tablet; Take 1 tablet (50 mg total) by mouth once daily.  Dispense: 30 tablet; Refill: 2  -     gabapentin (NEURONTIN) 300 MG capsule; Take 1 capsule (300 mg total) by mouth 3 (three) times daily.  Dispense: 90 capsule; Refill: 3  -     pantoprazole (PROTONIX) 40 MG tablet; Take 1 tablet (40 mg total) by mouth once daily.  Dispense: 30 tablet; Refill: 5      Medication List with Changes/Refills   New Medications    SERTRALINE (ZOLOFT) 50 MG TABLET    Take 1 tablet (50 mg total) by mouth once daily.   Current Medications    CETIRIZINE (ZYRTEC) 10 MG TABLET    Take 10 mg by mouth every morning.    FLUTICASONE PROPIONATE (FLONASE) 50 MCG/ACTUATION NASAL SPRAY    2 sprays by Each Nostril route as needed.    MYCOPHENOLATE (CELLCEPT) 500 MG TAB    Take 500 mg by mouth 2 (two) times daily. 2  tablets in the am and 2 in the pm    PREDNISONE (DELTASONE) 10 MG TABLET    Take 10 mg by mouth once daily.   Changed and/or Refilled Medications    Modified Medication Previous Medication    GABAPENTIN (NEURONTIN) 300 MG CAPSULE gabapentin (NEURONTIN) 300 MG capsule       Take 1 capsule (300 mg total) by mouth 3 (three) times daily.    Take 300 mg by mouth every evening.    LORAZEPAM (ATIVAN) 0.5 MG TABLET LORazepam (ATIVAN) 0.5 MG tablet       Take 1 tablet (0.5 mg total) by mouth every 4 (four) hours as needed for Anxiety.    Take 0.5 mg by mouth every 4 (four) hours as needed.    PANTOPRAZOLE (PROTONIX) 40 MG TABLET pantoprazole (PROTONIX) 40 MG tablet       Take 1 tablet (40 mg total) by mouth once daily.    Take 40 mg by mouth once daily.   Discontinued Medications    HYDROCODONE-ACETAMINOPHEN (NORCO) 5-325 MG PER TABLET    Take 1 tablet by mouth 2 (two) times daily as needed.    MELOXICAM (MOBIC) 15 MG TABLET    TK 1 T PO QD       This note was generated with the assistance of ambient listening technology. Verbal consent was obtained by the patient and accompanying visitor(s) for the recording of patient appointment to facilitate this note. I attest to having reviewed and edited the generated note for accuracy, though some syntax or spelling errors may persist. Please contact the author of this note for any clarification.            [1]   Social History  Socioeconomic History    Marital status:    Tobacco Use    Smoking status: Never    Smokeless tobacco: Never   Substance and Sexual Activity    Alcohol use: No    Drug use: No    Sexual activity: Yes     Partners: Male     Social Drivers of Health     Food Insecurity: No Food Insecurity (7/11/2025)    Received from [facility] and Its Subsidiaries and Affiliates    Hunger Vital Sign     Worried About Running Out of Food in the Last Year: Never true     Ran Out of Food in the Last Year: Never true   Transportation  Needs: No Transportation Needs (7/11/2025)    [facility]    PRAPARE - Transportation     Lack of Transportation (Medical): No     Lack of Transportation (Non-Medical): No   Housing Stability: Low Risk  (7/11/2025)    [facility]    Housing Stability Vital Sign     Unable to Pay for Housing in the Last Year: No     Number of Times Moved in the Last Year: 0     Homeless in the Last Year: No

## 2025-08-12 ENCOUNTER — TELEPHONE (OUTPATIENT)
Dept: INTERNAL MEDICINE | Facility: CLINIC | Age: 60
End: 2025-08-12
Payer: COMMERCIAL

## 2025-08-15 ENCOUNTER — PATIENT OUTREACH (OUTPATIENT)
Dept: ADMINISTRATIVE | Facility: OTHER | Age: 60
End: 2025-08-15
Payer: COMMERCIAL

## 2025-08-20 ENCOUNTER — TELEPHONE (OUTPATIENT)
Dept: TRANSPLANT | Facility: CLINIC | Age: 60
End: 2025-08-20
Payer: COMMERCIAL

## 2025-08-28 ENCOUNTER — POST MORTEM DOCUMENTATION (OUTPATIENT)
Dept: TRANSPLANT | Facility: CLINIC | Age: 60
End: 2025-08-28
Payer: COMMERCIAL